# Patient Record
Sex: MALE | Race: WHITE | NOT HISPANIC OR LATINO | Employment: UNEMPLOYED | URBAN - NONMETROPOLITAN AREA
[De-identification: names, ages, dates, MRNs, and addresses within clinical notes are randomized per-mention and may not be internally consistent; named-entity substitution may affect disease eponyms.]

---

## 2017-01-18 RX ORDER — ERGOCALCIFEROL 1.25 MG/1
CAPSULE ORAL
Qty: 6 CAPSULE | Refills: 5 | Status: SHIPPED | OUTPATIENT
Start: 2017-01-18

## 2017-02-22 DIAGNOSIS — E78.49 OTHER HYPERLIPIDEMIA: ICD-10-CM

## 2017-02-22 DIAGNOSIS — E89.0 POSTOPERATIVE HYPOTHYROIDISM: ICD-10-CM

## 2017-02-22 DIAGNOSIS — Z85.850 HISTORY OF THYROID CANCER: Primary | ICD-10-CM

## 2017-02-22 DIAGNOSIS — E55.9 VITAMIN D DEFICIENCY: ICD-10-CM

## 2017-03-02 ENCOUNTER — APPOINTMENT (OUTPATIENT)
Dept: LAB | Facility: HOSPITAL | Age: 62
End: 2017-03-02

## 2017-03-02 LAB
25(OH)D3 SERPL-MCNC: 21.1 NG/ML (ref 30–100)
ALBUMIN SERPL-MCNC: 4.7 G/DL (ref 3.4–4.8)
ALBUMIN/GLOB SERPL: 1.6 G/DL (ref 1.1–1.8)
ALP SERPL-CCNC: 36 U/L (ref 38–126)
ALT SERPL W P-5'-P-CCNC: 105 U/L (ref 21–72)
ANION GAP SERPL CALCULATED.3IONS-SCNC: 15 MMOL/L (ref 5–15)
ARTICHOKE IGE QN: 99 MG/DL (ref 1–129)
AST SERPL-CCNC: 113 U/L (ref 17–59)
BASOPHILS # BLD AUTO: 0.03 10*3/MM3 (ref 0–0.2)
BASOPHILS NFR BLD AUTO: 0.6 % (ref 0–2)
BILIRUB SERPL-MCNC: 0.7 MG/DL (ref 0.2–1.3)
BUN BLD-MCNC: 9 MG/DL (ref 7–21)
BUN/CREAT SERPL: 13 (ref 7–25)
CALCIUM SPEC-SCNC: 9.5 MG/DL (ref 8.4–10.2)
CHLORIDE SERPL-SCNC: 104 MMOL/L (ref 95–110)
CHOLEST SERPL-MCNC: 189 MG/DL (ref 0–199)
CO2 SERPL-SCNC: 21 MMOL/L (ref 22–31)
CREAT BLD-MCNC: 0.69 MG/DL (ref 0.7–1.3)
DEPRECATED RDW RBC AUTO: 47 FL (ref 35.1–43.9)
EOSINOPHIL # BLD AUTO: 0.14 10*3/MM3 (ref 0–0.7)
EOSINOPHIL NFR BLD AUTO: 2.7 % (ref 0–7)
ERYTHROCYTE [DISTWIDTH] IN BLOOD BY AUTOMATED COUNT: 15 % (ref 11.5–14.5)
GFR SERPL CREATININE-BSD FRML MDRD: 117 ML/MIN/1.73 (ref 49–113)
GLOBULIN UR ELPH-MCNC: 3 GM/DL (ref 2.3–3.5)
GLUCOSE BLD-MCNC: 88 MG/DL (ref 60–100)
HCT VFR BLD AUTO: 46.9 % (ref 39–49)
HDLC SERPL-MCNC: 54 MG/DL (ref 60–200)
HGB BLD-MCNC: 16.3 G/DL (ref 13.7–17.3)
IMM GRANULOCYTES # BLD: 0.01 10*3/MM3 (ref 0–0.02)
IMM GRANULOCYTES NFR BLD: 0.2 % (ref 0–0.5)
LDLC/HDLC SERPL: 2.01 {RATIO} (ref 0–3.55)
LYMPHOCYTES # BLD AUTO: 1.86 10*3/MM3 (ref 0.6–4.2)
LYMPHOCYTES NFR BLD AUTO: 35.6 % (ref 10–50)
MCH RBC QN AUTO: 29.5 PG (ref 26.5–34)
MCHC RBC AUTO-ENTMCNC: 34.8 G/DL (ref 31.5–36.3)
MCV RBC AUTO: 84.8 FL (ref 80–98)
MONOCYTES # BLD AUTO: 0.45 10*3/MM3 (ref 0–0.9)
MONOCYTES NFR BLD AUTO: 8.6 % (ref 0–12)
NEUTROPHILS # BLD AUTO: 2.73 10*3/MM3 (ref 2–8.6)
NEUTROPHILS NFR BLD AUTO: 52.3 % (ref 37–80)
NRBC BLD MANUAL-RTO: 0 /100 WBC (ref 0–0)
PLATELET # BLD AUTO: 236 10*3/MM3 (ref 150–450)
PMV BLD AUTO: 8.9 FL (ref 8–12)
POTASSIUM BLD-SCNC: 4.5 MMOL/L (ref 3.5–5.1)
PROT SERPL-MCNC: 7.7 G/DL (ref 6.3–8.6)
RBC # BLD AUTO: 5.53 10*6/MM3 (ref 4.37–5.74)
SODIUM BLD-SCNC: 140 MMOL/L (ref 137–145)
T4 FREE SERPL-MCNC: 1.11 NG/DL (ref 0.78–2.19)
TRIGL SERPL-MCNC: 132 MG/DL (ref 20–199)
TSH SERPL DL<=0.05 MIU/L-ACNC: 2.01 MIU/ML (ref 0.46–4.68)
WBC NRBC COR # BLD: 5.22 10*3/MM3 (ref 3.2–9.8)

## 2017-03-02 PROCEDURE — 36415 COLL VENOUS BLD VENIPUNCTURE: CPT | Performed by: NURSE PRACTITIONER

## 2017-03-02 PROCEDURE — 82306 VITAMIN D 25 HYDROXY: CPT | Performed by: NURSE PRACTITIONER

## 2017-03-02 PROCEDURE — 84443 ASSAY THYROID STIM HORMONE: CPT | Performed by: NURSE PRACTITIONER

## 2017-03-02 PROCEDURE — 84439 ASSAY OF FREE THYROXINE: CPT | Performed by: NURSE PRACTITIONER

## 2017-03-02 PROCEDURE — 86800 THYROGLOBULIN ANTIBODY: CPT | Performed by: NURSE PRACTITIONER

## 2017-03-02 PROCEDURE — 84432 ASSAY OF THYROGLOBULIN: CPT | Performed by: NURSE PRACTITIONER

## 2017-03-02 PROCEDURE — 80053 COMPREHEN METABOLIC PANEL: CPT | Performed by: NURSE PRACTITIONER

## 2017-03-02 PROCEDURE — 80061 LIPID PANEL: CPT | Performed by: NURSE PRACTITIONER

## 2017-03-02 PROCEDURE — 85025 COMPLETE CBC W/AUTO DIFF WBC: CPT | Performed by: NURSE PRACTITIONER

## 2017-03-03 LAB
THYROGLOB AB SERPL-ACNC: <1 IU/ML (ref 0–0.9)
THYROGLOBULIN BY IMA: <0.1 NG/ML (ref 1.4–29.2)

## 2017-03-06 ENCOUNTER — OFFICE VISIT (OUTPATIENT)
Dept: ENDOCRINOLOGY | Facility: CLINIC | Age: 62
End: 2017-03-06

## 2017-03-06 VITALS
DIASTOLIC BLOOD PRESSURE: 78 MMHG | HEART RATE: 84 BPM | BODY MASS INDEX: 28.5 KG/M2 | HEIGHT: 69 IN | WEIGHT: 192.4 LBS | SYSTOLIC BLOOD PRESSURE: 126 MMHG

## 2017-03-06 DIAGNOSIS — R73.01 IMPAIRED FASTING GLUCOSE: ICD-10-CM

## 2017-03-06 DIAGNOSIS — E78.49 OTHER HYPERLIPIDEMIA: ICD-10-CM

## 2017-03-06 DIAGNOSIS — E89.0 POSTOPERATIVE HYPOTHYROIDISM: Primary | ICD-10-CM

## 2017-03-06 DIAGNOSIS — E55.9 VITAMIN D DEFICIENCY: ICD-10-CM

## 2017-03-06 DIAGNOSIS — C73 THYROID CANCER (HCC): ICD-10-CM

## 2017-03-06 PROCEDURE — 99214 OFFICE O/P EST MOD 30 MIN: CPT | Performed by: NURSE PRACTITIONER

## 2017-03-06 NOTE — PROGRESS NOTES
Subjective    Dallas Jaime is a 61 y.o. male. he is here today for follow-up.    History of Present Illness       60 y o male comes for followup    History of Thyroid Cancer  1999 - right follwed by left thyroidectomy followed by RUST ablation    Timing of hypothryoidism - constant, cancer in remission   Quality - controlled  Severity - moderate    Alleviating Factors - on levothyroxine 150 Mond Friday and  175 Sat Sund    ---------    Dyslipidemia on on lipitor 10 mg qhs ---stopped lipitor taking crestor   Timing constant  Duration Jan 2013  Labs reviewed, , HDL 70 ( risk factors age and smoking) s    ----------    also impaired fasting glucose   Dx Jan 2013  Timing - constant, verified May 2013  Jan 2013 2 bg fasting at 102     Evaluation history:  TSH   Date Value Ref Range Status   03/02/2017 2.010 0.460 - 4.680 mIU/mL Final   07/06/2016 1.70 0.46 - 4.68 uIU/ml Final     FREE T4   Date Value Ref Range Status   03/02/2017 1.11 0.78 - 2.19 ng/dL Final   12/07/2015 1.55 0.78 - 2.19 ng/dl Final     T3, FREE   Date Value Ref Range Status   04/18/2014 2.5 2.0 - 4.4 pg/mL Final     Comment:     Performed at:  [facility]  [id number]  : Kemal Bob MD, Phone:  [phone]         Current medications:  Current Outpatient Prescriptions   Medication Sig Dispense Refill   • CRESTOR 20 MG tablet TAKE 1 TABLET DAILY (ANYTIME OF THE DAY) 90 tablet 2   • esomeprazole (NexIUM) 20 MG capsule Take 20 mg by mouth every morning before breakfast.     • HYDROcodone-acetaminophen (NORCO) 7.5-325 MG per tablet Take 1 tablet by mouth Every 6 (Six) Hours As Needed for severe pain (7-10). 40 tablet 0   • levothyroxine (SYNTHROID, LEVOTHROID) 150 MCG tablet      • levothyroxine (SYNTHROID, LEVOTHROID) 175 MCG tablet      • vitamin D (ERGOCALCIFEROL) 87459 UNITS capsule capsule AT START OF THERAPY TAKE 1 CAPSULE EVERY WEEK FOR 12 WEEKS, THEN TAKE 1 CAPSULE EVERY 2 WEEKS  THEREAFTER 6 capsule 5     No current facility-administered medications for this visit.        The following portions of the patient's history were reviewed and updated as appropriate:   Past Medical History   Diagnosis Date   • Acute bronchitis    • Acute otitis media    • Allergic rhinitis    • Anorectal abscess    • Diverticular disease of colon    • External hemorrhoids without complication    • Foot pain      probable arthritis   • GERD (gastroesophageal reflux disease)    • Hallux valgus    • History of colon polyps    • History of malignant neoplasm of thyroid    • Hypercholesterolemia    • Hyperlipidemia    • Hypermetropia    • Impaired glucose tolerance    • Infective otitis externa      right ear   • Metatarsalgia    • Need for influenza vaccination    • Pain in eye      suspect neuralgia, TGN   • Postoperative hypothyroidism    • Presbyopia    • Sprain of knee      left   • Thyroid cancer      Follicular carcinoma.  Had both sides removed in 2 separate surgeries with radiation   • Tobacco dependence syndrome    • Upper respiratory infection    • Vitamin D deficiency    • Vitamin deficiency      Past Surgical History   Procedure Laterality Date   • Knee surgery     • Thyroidectomy Bilateral    • Appendectomy     • Colonoscopy  01/18/2013     diverticulum is sigmoid colon & descending colon.  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   • Thyroid surgery       Family History   Problem Relation Age of Onset   • Cancer Father    • Diabetes Mother    • Coronary artery disease Mother    • Diabetes type I Sister    • Cancer Other    • Coronary artery disease Other    • Diabetes Other    • Hypertension Other        No Known Allergies  Social History  "    Social History   • Marital status:      Spouse name: N/A   • Number of children: N/A   • Years of education: N/A     Social History Main Topics   • Smoking status: Current Every Day Smoker     Packs/day: 1.00   • Smokeless tobacco: None   • Alcohol use None   • Drug use: None   • Sexual activity: Not Asked     Other Topics Concern   • None     Social History Narrative       Review of Systems  Review of Systems   Constitutional: Negative for activity change, appetite change, chills, diaphoresis and fatigue.   HENT: Negative for congestion, dental problem, drooling, ear discharge, ear pain, facial swelling, sneezing, sore throat, tinnitus, trouble swallowing and voice change.    Eyes: Negative for photophobia, pain, discharge, redness, itching and visual disturbance.   Respiratory: Negative for apnea, cough, choking, chest tightness and shortness of breath.    Cardiovascular: Negative for chest pain, palpitations and leg swelling.   Gastrointestinal: Negative for abdominal distention, abdominal pain, constipation, diarrhea, nausea and vomiting.   Endocrine: Negative for cold intolerance, heat intolerance, polydipsia, polyphagia and polyuria.   Genitourinary: Negative for difficulty urinating, dysuria, frequency, hematuria and urgency.   Musculoskeletal: Negative for arthralgias, back pain, gait problem, joint swelling, myalgias, neck pain and neck stiffness.   Skin: Negative for color change, pallor, rash and wound.   Allergic/Immunologic: Negative for environmental allergies, food allergies and immunocompromised state.   Neurological: Negative for dizziness, tremors, facial asymmetry, weakness, light-headedness, numbness and headaches.   Hematological: Negative for adenopathy. Does not bruise/bleed easily.   Psychiatric/Behavioral: Negative for agitation, behavioral problems, confusion, decreased concentration and sleep disturbance.        Objective      Visit Vitals   • /78   • Pulse 84   • Ht 69\" " (175.3 cm)   • Wt 192 lb 6.4 oz (87.3 kg)   • BMI 28.41 kg/m2     Physical Exam   Constitutional: He is oriented to person, place, and time. He appears well-developed and well-nourished. No distress.   HENT:   Head: Normocephalic and atraumatic.   Right Ear: External ear normal.   Left Ear: External ear normal.   Nose: Nose normal.   Eyes: Conjunctivae and EOM are normal. Pupils are equal, round, and reactive to light.   Neck: Normal range of motion. Neck supple. No tracheal deviation present.   Thyroid surgically absent   Cardiovascular: Normal rate, regular rhythm and normal heart sounds.    No murmur heard.  Pulmonary/Chest: Effort normal and breath sounds normal. No respiratory distress. He has no wheezes.   Abdominal: Soft. Bowel sounds are normal. There is no tenderness. There is no rebound and no guarding.   Musculoskeletal: Normal range of motion. He exhibits no edema, tenderness or deformity.   Neurological: He is alert and oriented to person, place, and time. No cranial nerve deficit.   Skin: Skin is warm and dry. No rash noted.   Psychiatric: He has a normal mood and affect. His behavior is normal. Judgment and thought content normal.       Lab Review  Lab Results   Component Value Date    TSH 2.010 03/02/2017    TSH 1.70 07/06/2016     Lab Results   Component Value Date    FREET4 1.11 03/02/2017    FREET4 1.55 12/07/2015        Assessment/Plan      1. Postoperative hypothyroidism    2. Thyroid cancer    3. Vitamin D deficiency    4. Impaired fasting glucose    5. Other hyperlipidemia    . This diagnosis was discussed and reviewed with the patient including the advantages of drug therapy.     1. Orders placed during this encounter include:  Orders Placed This Encounter   Procedures   • Hemoglobin A1c   • Comprehensive Metabolic Panel     Standing Status:   Future   • TSH     Standing Status:   Future   • Hemoglobin A1c     Standing Status:   Future   • Vitamin D 25 Hydroxy     Standing Status:   Future    • Thyroglobulin With Anti-TG     Standing Status:   Future       Medications prescribed:  Outpatient Encounter Prescriptions as of 3/6/2017   Medication Sig Dispense Refill   • CRESTOR 20 MG tablet TAKE 1 TABLET DAILY (ANYTIME OF THE DAY) 90 tablet 2   • esomeprazole (NexIUM) 20 MG capsule Take 20 mg by mouth every morning before breakfast.     • HYDROcodone-acetaminophen (NORCO) 7.5-325 MG per tablet Take 1 tablet by mouth Every 6 (Six) Hours As Needed for severe pain (7-10). 40 tablet 0   • levothyroxine (SYNTHROID, LEVOTHROID) 150 MCG tablet      • levothyroxine (SYNTHROID, LEVOTHROID) 175 MCG tablet      • vitamin D (ERGOCALCIFEROL) 42159 UNITS capsule capsule AT START OF THERAPY TAKE 1 CAPSULE EVERY WEEK FOR 12 WEEKS, THEN TAKE 1 CAPSULE EVERY 2 WEEKS THEREAFTER 6 capsule 5     No facility-administered encounter medications on file as of 3/6/2017.      Plan Details  previous records from [facility] dating back to 2012 were requested, obtained and reviewed.     Thyroid cancer  initial tx , surgery and RUST ablation in 1999    euthyroid - cont same LT4 regimen described in HPI     tg - normal    tg - pending at time of visit    Dec. 2015    TSH - 0.18    keep 150mcg Mon - Friday  keep 175mcgs onSaturday and take none on Sunday June 2016    tg <1  tg ab <0.1    March 2017    TSH - 2    Tg ab <1.0  Tg <0.1    Keep current regimen  ------------    Dyslipidemia    started on lipitor in Jan 2013 but experiencing myalgias.     Since initial LDL was 170 and he has prediabetes his target LDL should be less than 100 and that requires a 40% reduction. No other statin but lipitor or crestor will be sufficient.    I wrote letter for crestor instead of lipitor    taking crestor -- continue    Oct. 2014    total chol -178  TG- 89  HDL- 71  LDL -89    March 2017    LDL - 90     Elevated liver enzymes - states drinking to much beer-- does not want referral to GI    ---------------    Prediabetes, not obese, no need for metfomin  at this point  May 2013- HgbA1c of 5.7% , IFG confirmed ( 3 bg above 99)    Dec. 2015    HgbA1c 5.6%    ---------------    Vit D Def  levels of 20 in May 2013    contiue vit D replacement    not taking    Dec. 2015    vitd - 27    please restart vit d    Not taking    March 2017    Vitamin d - 21.1        4. Return in about 6 months (around 9/6/2017) for Recheck.

## 2017-04-17 DIAGNOSIS — E89.0 POSTOPERATIVE HYPOTHYROIDISM: Primary | ICD-10-CM

## 2017-04-19 ENCOUNTER — OFFICE VISIT (OUTPATIENT)
Dept: FAMILY MEDICINE CLINIC | Facility: CLINIC | Age: 62
End: 2017-04-19

## 2017-04-19 VITALS
HEIGHT: 69 IN | OXYGEN SATURATION: 98 % | HEART RATE: 91 BPM | DIASTOLIC BLOOD PRESSURE: 90 MMHG | SYSTOLIC BLOOD PRESSURE: 140 MMHG | WEIGHT: 188 LBS | BODY MASS INDEX: 27.85 KG/M2

## 2017-04-19 DIAGNOSIS — F17.210 CIGARETTE SMOKER: ICD-10-CM

## 2017-04-19 DIAGNOSIS — F41.0 PANIC DISORDER: Primary | ICD-10-CM

## 2017-04-19 DIAGNOSIS — Z71.6 TOBACCO ABUSE COUNSELING: ICD-10-CM

## 2017-04-19 PROCEDURE — 99202 OFFICE O/P NEW SF 15 MIN: CPT | Performed by: FAMILY MEDICINE

## 2017-04-19 NOTE — PATIENT INSTRUCTIONS
Panic Attacks  Panic attacks are sudden, short-lived surges of severe anxiety, fear, or discomfort. They may occur for no reason when you are relaxed, when you are anxious, or when you are sleeping. Panic attacks may occur for a number of reasons:   · Healthy people occasionally have panic attacks in extreme, life-threatening situations, such as war or natural disasters. Normal anxiety is a protective mechanism of the body that helps us react to danger (fight or flight response).  · Panic attacks are often seen with anxiety disorders, such as panic disorder, social anxiety disorder, generalized anxiety disorder, and phobias. Anxiety disorders cause excessive or uncontrollable anxiety. They may interfere with your relationships or other life activities.  · Panic attacks are sometimes seen with other mental illnesses, such as depression and posttraumatic stress disorder.  · Certain medical conditions, prescription medicines, and drugs of abuse can cause panic attacks.  SYMPTOMS   Panic attacks start suddenly, peak within 20 minutes, and are accompanied by four or more of the following symptoms:  · Pounding heart or fast heart rate (palpitations).  · Sweating.  · Trembling or shaking.  · Shortness of breath or feeling smothered.  · Feeling choked.  · Chest pain or discomfort.  · Nausea or strange feeling in your stomach.  · Dizziness, light-headedness, or feeling like you will faint.  · Chills or hot flushes.  · Numbness or tingling in your lips or hands and feet.  · Feeling that things are not real or feeling that you are not yourself.  · Fear of losing control or going crazy.  · Fear of dying.  Some of these symptoms can mimic serious medical conditions. For example, you may think you are having a heart attack. Although panic attacks can be very scary, they are not life threatening.  DIAGNOSIS   Panic attacks are diagnosed through an assessment by your health care provider. Your health care provider will ask  questions about your symptoms, such as where and when they occurred. Your health care provider will also ask about your medical history and use of alcohol and drugs, including prescription medicines. Your health care provider may order blood tests or other studies to rule out a serious medical condition. Your health care provider may refer you to a mental health professional for further evaluation.  TREATMENT   · Most healthy people who have one or two panic attacks in an extreme, life-threatening situation will not require treatment.  · The treatment for panic attacks associated with anxiety disorders or other mental illness typically involves counseling with a mental health professional, medicine, or a combination of both. Your health care provider will help determine what treatment is best for you.  · Panic attacks due to physical illness usually go away with treatment of the illness. If prescription medicine is causing panic attacks, talk with your health care provider about stopping the medicine, decreasing the dose, or substituting another medicine.  · Panic attacks due to alcohol or drug abuse go away with abstinence. Some adults need professional help in order to stop drinking or using drugs.  HOME CARE INSTRUCTIONS   · Take all medicines as directed by your health care provider.    · Schedule and attend follow-up visits as directed by your health care provider. It is important to keep all your appointments.  SEEK MEDICAL CARE IF:  · You are not able to take your medicines as prescribed.  · Your symptoms do not improve or get worse.  SEEK IMMEDIATE MEDICAL CARE IF:   · You experience panic attack symptoms that are different than your usual symptoms.  · You have serious thoughts about hurting yourself or others.  · You are taking medicine for panic attacks and have a serious side effect.  MAKE SURE YOU:  · Understand these instructions.  · Will watch your condition.  · Will get help right away if you are not  doing well or get worse.     This information is not intended to replace advice given to you by your health care provider. Make sure you discuss any questions you have with your health care provider.     Document Released: 12/18/2006 Document Revised: 12/23/2014 Document Reviewed: 08/01/2014  Elsevier Interactive Patient Education ©2016 [employer]

## 2017-04-19 NOTE — PROGRESS NOTES
Subjective   Dallas Jaime is a 61 y.o. male. He has long history of panic attacks which usually strike in the morning. His symptoms were treated for many years with Xanax. He is a retired nurse. Family history includes bipolar illness in father. He has a history of thyroid problems managed by a local endocrinologist.    Panic Attack   This is a chronic problem. The current episode started more than 1 year ago. The problem occurs intermittently. The problem has been gradually worsening. Pertinent negatives include no abdominal pain, arthralgias, chest pain, coughing, fatigue, fever, headaches, nausea, rash, sore throat or weakness. Nothing aggravates the symptoms. He has tried nothing for the symptoms. The treatment provided no relief.        The following portions of the patient's history were reviewed and updated as appropriate: allergies, current medications, past family history, past medical history, past social history, past surgical history and problem list.    Review of Systems   Constitutional: Negative for activity change, appetite change, fatigue and fever.   HENT: Negative for ear pain and sore throat.    Eyes: Negative for pain and visual disturbance.   Respiratory: Negative for cough and shortness of breath.    Cardiovascular: Negative for chest pain and palpitations.   Gastrointestinal: Negative for abdominal pain and nausea.   Endocrine: Negative for cold intolerance and heat intolerance.   Genitourinary: Negative for difficulty urinating and dysuria.   Musculoskeletal: Negative for arthralgias and gait problem.   Skin: Negative for color change and rash.   Neurological: Negative for dizziness, weakness and headaches.   Hematological: Negative for adenopathy. Does not bruise/bleed easily.   Psychiatric/Behavioral: Negative for agitation, confusion and sleep disturbance. The patient is nervous/anxious.        Objective   Physical Exam   Constitutional: He is oriented to person, place, and time.  He appears well-developed and well-nourished.   HENT:   Head: Normocephalic and atraumatic.   Right Ear: External ear normal.   Left Ear: External ear normal.   Nose: Nose normal.   Mouth/Throat: Oropharynx is clear and moist.   Eyes: Conjunctivae and EOM are normal. Pupils are equal, round, and reactive to light. Right eye exhibits no discharge. Left eye exhibits no discharge. No scleral icterus.   Neck: Normal range of motion. Neck supple. No JVD present. No tracheal deviation present. No thyromegaly present.   Cardiovascular: Normal rate, regular rhythm, normal heart sounds and intact distal pulses.  Exam reveals no gallop and no friction rub.    No murmur heard.  Pulmonary/Chest: Effort normal and breath sounds normal. No stridor. No respiratory distress. He has no wheezes. He has no rales. He exhibits no tenderness.   Abdominal: Soft. Bowel sounds are normal. He exhibits no distension and no mass. There is no tenderness. There is no rebound and no guarding. No hernia.   Musculoskeletal: Normal range of motion. He exhibits no edema or deformity.   Lymphadenopathy:     He has no cervical adenopathy.   Neurological: He is alert and oriented to person, place, and time. He exhibits normal muscle tone. Coordination normal.   Skin: Skin is warm and dry. No erythema.   Psychiatric: He has a normal mood and affect. His speech is normal and behavior is normal. Judgment and thought content normal. His mood appears not anxious. He is not agitated. Cognition and memory are normal. He does not exhibit a depressed mood.   Nursing note and vitals reviewed.      Assessment/Plan   Dallas was seen today for [facility] care and panic attack.    Diagnoses and all orders for this visit:    Panic disorder  -     Discontinue: sertraline (ZOLOFT) 50 MG tablet; Take 1 tablet by mouth Daily.  -     sertraline (ZOLOFT) 50 MG tablet; Take 1 tablet by mouth Daily.    Cigarette smoker    Tobacco abuse counseling    Monitor panic symptoms and  f/u in1 months.

## 2017-04-21 DIAGNOSIS — F41.0 ANXIETY ATTACK: Primary | ICD-10-CM

## 2017-04-21 DIAGNOSIS — F41.9 ANXIETY: Primary | ICD-10-CM

## 2017-04-21 RX ORDER — BUSPIRONE HYDROCHLORIDE 30 MG/1
15 TABLET ORAL 2 TIMES DAILY
Qty: 15 TABLET | Refills: 2 | Status: SHIPPED | OUTPATIENT
Start: 2017-04-21 | End: 2017-05-04

## 2017-04-21 RX ORDER — FLUOXETINE HYDROCHLORIDE 20 MG/1
20 CAPSULE ORAL DAILY
Qty: 30 CAPSULE | Refills: 2 | Status: SHIPPED | OUTPATIENT
Start: 2017-04-21 | End: 2017-05-04

## 2017-05-04 ENCOUNTER — OFFICE VISIT (OUTPATIENT)
Dept: FAMILY MEDICINE CLINIC | Facility: CLINIC | Age: 62
End: 2017-05-04

## 2017-05-04 VITALS
OXYGEN SATURATION: 98 % | DIASTOLIC BLOOD PRESSURE: 80 MMHG | BODY MASS INDEX: 27.99 KG/M2 | SYSTOLIC BLOOD PRESSURE: 120 MMHG | WEIGHT: 189 LBS | HEART RATE: 84 BPM | HEIGHT: 69 IN

## 2017-05-04 DIAGNOSIS — F41.0 PANIC DISORDER: Primary | ICD-10-CM

## 2017-05-04 DIAGNOSIS — Z23 NEED FOR VACCINATION: ICD-10-CM

## 2017-05-04 PROCEDURE — 90715 TDAP VACCINE 7 YRS/> IM: CPT | Performed by: FAMILY MEDICINE

## 2017-05-04 PROCEDURE — 90471 IMMUNIZATION ADMIN: CPT | Performed by: FAMILY MEDICINE

## 2017-05-04 PROCEDURE — 99214 OFFICE O/P EST MOD 30 MIN: CPT | Performed by: FAMILY MEDICINE

## 2017-05-04 RX ORDER — CLONAZEPAM 0.5 MG/1
TABLET ORAL
Qty: 30 TABLET | Refills: 0 | Status: SHIPPED | OUTPATIENT
Start: 2017-05-04 | End: 2017-05-18 | Stop reason: SDUPTHER

## 2017-05-18 ENCOUNTER — OFFICE VISIT (OUTPATIENT)
Dept: FAMILY MEDICINE CLINIC | Facility: CLINIC | Age: 62
End: 2017-05-18

## 2017-05-18 VITALS
SYSTOLIC BLOOD PRESSURE: 122 MMHG | HEIGHT: 69 IN | WEIGHT: 191.2 LBS | BODY MASS INDEX: 28.32 KG/M2 | DIASTOLIC BLOOD PRESSURE: 80 MMHG

## 2017-05-18 DIAGNOSIS — F41.0 PANIC DISORDER: Primary | ICD-10-CM

## 2017-05-18 DIAGNOSIS — Z11.59 NEED FOR HEPATITIS C SCREENING TEST: ICD-10-CM

## 2017-05-18 PROCEDURE — 99213 OFFICE O/P EST LOW 20 MIN: CPT | Performed by: FAMILY MEDICINE

## 2017-05-18 RX ORDER — CLONAZEPAM 0.5 MG/1
TABLET ORAL
Qty: 19 TABLET | Refills: 1 | Status: SHIPPED | OUTPATIENT
Start: 2017-05-18 | End: 2017-05-18 | Stop reason: SDUPTHER

## 2017-05-18 RX ORDER — CLONAZEPAM 0.5 MG/1
TABLET ORAL
Qty: 90 TABLET | Refills: 1 | Status: SHIPPED | OUTPATIENT
Start: 2017-05-18 | End: 2017-09-06 | Stop reason: SDUPTHER

## 2017-07-07 RX ORDER — LEVOTHYROXINE SODIUM 0.15 MG/1
TABLET ORAL
Qty: 65 TABLET | Refills: 2 | Status: SHIPPED | OUTPATIENT
Start: 2017-07-07 | End: 2017-09-06

## 2017-07-19 ENCOUNTER — OFFICE VISIT (OUTPATIENT)
Dept: FAMILY MEDICINE CLINIC | Facility: CLINIC | Age: 62
End: 2017-07-19

## 2017-07-19 VITALS
HEIGHT: 69 IN | DIASTOLIC BLOOD PRESSURE: 90 MMHG | SYSTOLIC BLOOD PRESSURE: 158 MMHG | WEIGHT: 189.4 LBS | BODY MASS INDEX: 28.05 KG/M2

## 2017-07-19 DIAGNOSIS — R53.83 FATIGUE, UNSPECIFIED TYPE: Primary | ICD-10-CM

## 2017-07-19 DIAGNOSIS — R00.2 HEART PALPITATIONS: ICD-10-CM

## 2017-07-19 PROBLEM — E89.0 POSTOPERATIVE HYPOTHYROIDISM: Chronic | Status: ACTIVE | Noted: 2017-03-06

## 2017-07-19 PROBLEM — F41.0 PANIC DISORDER: Chronic | Status: ACTIVE | Noted: 2017-04-19

## 2017-07-19 PROBLEM — R73.01 IMPAIRED FASTING GLUCOSE: Chronic | Status: ACTIVE | Noted: 2017-03-06

## 2017-07-19 PROBLEM — Z71.6 TOBACCO ABUSE COUNSELING: Status: RESOLVED | Noted: 2017-04-19 | Resolved: 2017-07-19

## 2017-07-19 PROBLEM — E55.9 VITAMIN D DEFICIENCY: Chronic | Status: ACTIVE | Noted: 2017-03-06

## 2017-07-19 PROBLEM — F17.210 CIGARETTE SMOKER: Chronic | Status: ACTIVE | Noted: 2017-04-19

## 2017-07-19 PROBLEM — C73 THYROID CANCER (HCC): Chronic | Status: ACTIVE | Noted: 2017-03-06

## 2017-07-19 PROCEDURE — 99214 OFFICE O/P EST MOD 30 MIN: CPT | Performed by: FAMILY MEDICINE

## 2017-07-19 NOTE — PROGRESS NOTES
Subjective   Dallas Jaime is a 61 y.o. male.     History of Present Illness requesting evaluation 2 weeks of early morning late morning fatigability history of intermittent palpitations of heart.  Mild tiredness at the end of the day.  No real PND or orthopnea.  No real chest pain.  Risk factors of tobacco abuse thyroid disease treated as mentioned previously.  Is due for thyroid evaluation soon.  No unusual weight loss or weight gain.  History noted.    The following portions of the patient's history were reviewed and updated as appropriate: allergies, current medications, past family history, past medical history, past social history, past surgical history and problem list.    Review of Systems   Constitutional: Positive for fatigue. Negative for activity change, appetite change and unexpected weight change.   HENT: Negative for trouble swallowing and voice change.    Eyes: Negative for redness and visual disturbance.   Respiratory: Negative for cough and wheezing.    Cardiovascular: Positive for palpitations. Negative for chest pain.   Gastrointestinal: Negative for abdominal pain, constipation, diarrhea, nausea and vomiting.   Genitourinary: Negative for urgency.   Musculoskeletal: Negative for joint swelling.   Neurological: Negative for syncope and headaches.   Hematological: Negative for adenopathy.   Psychiatric/Behavioral: Negative for sleep disturbance.       Objective   Physical Exam   Constitutional: He appears well-developed.   HENT:   Head: Normocephalic.   Eyes: Pupils are equal, round, and reactive to light.   Neck: Normal range of motion. No JVD present. No tracheal deviation present. No thyromegaly present.   Cardiovascular: Normal rate, regular rhythm, normal heart sounds and intact distal pulses.    Pulmonary/Chest: Effort normal and breath sounds normal. No respiratory distress.   Abdominal: Soft. Bowel sounds are normal.   Neurological: He is alert. He has normal reflexes.    Psychiatric: He has a normal mood and affect. His speech is normal. He is slowed.       Assessment/Plan   Problems Addressed this Visit     None      Visit Diagnoses     Fatigue, unspecified type    -  Primary    Relevant Orders    Ambulatory Referral to Cardiology    Heart palpitations        Relevant Orders    Ambulatory Referral to Cardiology        Constitutional symptoms.  At risk for intermittent atrial fib and/or other structural cardiac disease.   need for cardiac evaluation as well as getting thyroid studies redone per endocrinology.  Have made arrangements for same.  Counseled on previous.  Follow-up based on results.

## 2017-07-20 ENCOUNTER — APPOINTMENT (OUTPATIENT)
Dept: GENERAL RADIOLOGY | Facility: HOSPITAL | Age: 62
End: 2017-07-20

## 2017-07-20 ENCOUNTER — APPOINTMENT (OUTPATIENT)
Dept: CT IMAGING | Facility: HOSPITAL | Age: 62
End: 2017-07-20

## 2017-07-20 ENCOUNTER — HOSPITAL ENCOUNTER (EMERGENCY)
Facility: HOSPITAL | Age: 62
Discharge: HOME | End: 2017-07-20
Attending: EMERGENCY MEDICINE | Admitting: EMERGENCY MEDICINE

## 2017-07-20 VITALS
TEMPERATURE: 98.5 F | HEIGHT: 69 IN | DIASTOLIC BLOOD PRESSURE: 91 MMHG | SYSTOLIC BLOOD PRESSURE: 160 MMHG | WEIGHT: 180 LBS | HEART RATE: 63 BPM | BODY MASS INDEX: 26.66 KG/M2 | OXYGEN SATURATION: 97 % | RESPIRATION RATE: 20 BRPM

## 2017-07-20 DIAGNOSIS — R00.2 INTERMITTENT PALPITATIONS: Primary | ICD-10-CM

## 2017-07-20 LAB
ALBUMIN SERPL-MCNC: 4.6 G/DL (ref 3.4–4.8)
ALBUMIN/GLOB SERPL: 1.4 G/DL (ref 1.1–1.8)
ALP SERPL-CCNC: 45 U/L (ref 38–126)
ALT SERPL W P-5'-P-CCNC: 131 U/L (ref 21–72)
ANION GAP SERPL CALCULATED.3IONS-SCNC: 18 MMOL/L (ref 5–15)
APTT PPP: 25.8 SECONDS (ref 20–40.3)
AST SERPL-CCNC: 195 U/L (ref 17–59)
BASOPHILS # BLD AUTO: 0.02 10*3/MM3 (ref 0–0.2)
BASOPHILS NFR BLD AUTO: 0.4 % (ref 0–2)
BILIRUB SERPL-MCNC: 0.6 MG/DL (ref 0.2–1.3)
BUN BLD-MCNC: 7 MG/DL (ref 7–21)
BUN/CREAT SERPL: 8.6 (ref 7–25)
CALCIUM SPEC-SCNC: 9.9 MG/DL (ref 8.4–10.2)
CHLORIDE SERPL-SCNC: 102 MMOL/L (ref 95–110)
CK MB SERPL-CCNC: 0.7 NG/ML (ref 0–5)
CK SERPL-CCNC: 99 U/L (ref 55–170)
CO2 SERPL-SCNC: 24 MMOL/L (ref 22–31)
CREAT BLD-MCNC: 0.81 MG/DL (ref 0.7–1.3)
D-DIMER, QUANTITATIVE (MAD,POW, STR): 557 NG/ML (FEU) (ref 0–470)
DEPRECATED RDW RBC AUTO: 48.5 FL (ref 35.1–43.9)
EOSINOPHIL # BLD AUTO: 0.07 10*3/MM3 (ref 0–0.7)
EOSINOPHIL NFR BLD AUTO: 1.3 % (ref 0–7)
ERYTHROCYTE [DISTWIDTH] IN BLOOD BY AUTOMATED COUNT: 15.1 % (ref 11.5–14.5)
GFR SERPL CREATININE-BSD FRML MDRD: 97 ML/MIN/1.73 (ref 60–113)
GLOBULIN UR ELPH-MCNC: 3.2 GM/DL (ref 2.3–3.5)
GLUCOSE BLD-MCNC: 98 MG/DL (ref 60–100)
HCT VFR BLD AUTO: 45.2 % (ref 39–49)
HGB BLD-MCNC: 15.4 G/DL (ref 13.7–17.3)
HOLD SPECIMEN: NORMAL
HOLD SPECIMEN: NORMAL
IMM GRANULOCYTES # BLD: 0.01 10*3/MM3 (ref 0–0.02)
IMM GRANULOCYTES NFR BLD: 0.2 % (ref 0–0.5)
INR PPP: 0.89 (ref 0.8–1.2)
LYMPHOCYTES # BLD AUTO: 2 10*3/MM3 (ref 0.6–4.2)
LYMPHOCYTES NFR BLD AUTO: 38.5 % (ref 10–50)
MAGNESIUM SERPL-MCNC: 1.8 MG/DL (ref 1.6–2.3)
MCH RBC QN AUTO: 30 PG (ref 26.5–34)
MCHC RBC AUTO-ENTMCNC: 34.1 G/DL (ref 31.5–36.3)
MCV RBC AUTO: 88.1 FL (ref 80–98)
MONOCYTES # BLD AUTO: 0.44 10*3/MM3 (ref 0–0.9)
MONOCYTES NFR BLD AUTO: 8.5 % (ref 0–12)
NEUTROPHILS # BLD AUTO: 2.65 10*3/MM3 (ref 2–8.6)
NEUTROPHILS NFR BLD AUTO: 51.1 % (ref 37–80)
NT-PROBNP SERPL-MCNC: 16.4 PG/ML (ref 0–900)
PLATELET # BLD AUTO: 163 10*3/MM3 (ref 150–450)
PMV BLD AUTO: 11.1 FL (ref 8–12)
POTASSIUM BLD-SCNC: 3.6 MMOL/L (ref 3.5–5.1)
PROT SERPL-MCNC: 7.8 G/DL (ref 6.3–8.6)
PROTHROMBIN TIME: 11.9 SECONDS (ref 11.1–15.3)
RBC # BLD AUTO: 5.13 10*6/MM3 (ref 4.37–5.74)
SODIUM BLD-SCNC: 144 MMOL/L (ref 137–145)
TROPONIN I SERPL-MCNC: <0.012 NG/ML
TROPONIN I SERPL-MCNC: <0.012 NG/ML
TSH SERPL DL<=0.05 MIU/L-ACNC: 4.87 MIU/ML (ref 0.46–4.68)
WBC NRBC COR # BLD: 5.19 10*3/MM3 (ref 3.2–9.8)
WHOLE BLOOD HOLD SPECIMEN: NORMAL
WHOLE BLOOD HOLD SPECIMEN: NORMAL

## 2017-07-20 PROCEDURE — 93005 ELECTROCARDIOGRAM TRACING: CPT

## 2017-07-20 PROCEDURE — 96360 HYDRATION IV INFUSION INIT: CPT

## 2017-07-20 PROCEDURE — 85379 FIBRIN DEGRADATION QUANT: CPT | Performed by: EMERGENCY MEDICINE

## 2017-07-20 PROCEDURE — 83735 ASSAY OF MAGNESIUM: CPT | Performed by: EMERGENCY MEDICINE

## 2017-07-20 PROCEDURE — 85730 THROMBOPLASTIN TIME PARTIAL: CPT | Performed by: EMERGENCY MEDICINE

## 2017-07-20 PROCEDURE — 82550 ASSAY OF CK (CPK): CPT | Performed by: EMERGENCY MEDICINE

## 2017-07-20 PROCEDURE — 99283 EMERGENCY DEPT VISIT LOW MDM: CPT

## 2017-07-20 PROCEDURE — 84443 ASSAY THYROID STIM HORMONE: CPT | Performed by: EMERGENCY MEDICINE

## 2017-07-20 PROCEDURE — 85610 PROTHROMBIN TIME: CPT | Performed by: EMERGENCY MEDICINE

## 2017-07-20 PROCEDURE — 85025 COMPLETE CBC W/AUTO DIFF WBC: CPT | Performed by: EMERGENCY MEDICINE

## 2017-07-20 PROCEDURE — 84484 ASSAY OF TROPONIN QUANT: CPT | Performed by: EMERGENCY MEDICINE

## 2017-07-20 PROCEDURE — 99284 EMERGENCY DEPT VISIT MOD MDM: CPT

## 2017-07-20 PROCEDURE — 80053 COMPREHEN METABOLIC PANEL: CPT | Performed by: EMERGENCY MEDICINE

## 2017-07-20 PROCEDURE — 82553 CREATINE MB FRACTION: CPT | Performed by: EMERGENCY MEDICINE

## 2017-07-20 PROCEDURE — 93010 ELECTROCARDIOGRAM REPORT: CPT | Performed by: INTERNAL MEDICINE

## 2017-07-20 PROCEDURE — 71275 CT ANGIOGRAPHY CHEST: CPT

## 2017-07-20 PROCEDURE — 71020 HC CHEST PA AND LATERAL: CPT

## 2017-07-20 PROCEDURE — 83880 ASSAY OF NATRIURETIC PEPTIDE: CPT | Performed by: EMERGENCY MEDICINE

## 2017-07-20 PROCEDURE — 0 IOPAMIDOL PER 1 ML: Performed by: EMERGENCY MEDICINE

## 2017-07-20 RX ORDER — ASPIRIN 325 MG
325 TABLET ORAL ONCE
Status: COMPLETED | OUTPATIENT
Start: 2017-07-20 | End: 2017-07-20

## 2017-07-20 RX ORDER — IPRATROPIUM BROMIDE AND ALBUTEROL SULFATE 2.5; .5 MG/3ML; MG/3ML
3 SOLUTION RESPIRATORY (INHALATION) ONCE
Status: COMPLETED | OUTPATIENT
Start: 2017-07-20 | End: 2017-07-20

## 2017-07-20 RX ADMIN — SODIUM CHLORIDE 500 ML: 9 INJECTION, SOLUTION INTRAVENOUS at 20:50

## 2017-07-20 RX ADMIN — IPRATROPIUM BROMIDE AND ALBUTEROL SULFATE 3 ML: 2.5; .5 SOLUTION RESPIRATORY (INHALATION) at 19:12

## 2017-07-20 RX ADMIN — METOPROLOL TARTRATE 12.5 MG: 25 TABLET, FILM COATED ORAL at 22:13

## 2017-07-20 RX ADMIN — IOPAMIDOL 80 ML: 755 INJECTION, SOLUTION INTRAVENOUS at 20:07

## 2017-07-20 RX ADMIN — ASPIRIN 325 MG: 325 TABLET, COATED ORAL at 19:12

## 2017-07-21 ENCOUNTER — OFFICE VISIT (OUTPATIENT)
Dept: ENDOCRINOLOGY | Facility: CLINIC | Age: 62
End: 2017-07-21

## 2017-07-21 VITALS
WEIGHT: 188.9 LBS | DIASTOLIC BLOOD PRESSURE: 75 MMHG | BODY MASS INDEX: 27.98 KG/M2 | HEIGHT: 69 IN | SYSTOLIC BLOOD PRESSURE: 130 MMHG | HEART RATE: 76 BPM

## 2017-07-21 DIAGNOSIS — F17.210 CIGARETTE SMOKER: Chronic | ICD-10-CM

## 2017-07-21 DIAGNOSIS — E89.0 POSTOPERATIVE HYPOTHYROIDISM: Primary | Chronic | ICD-10-CM

## 2017-07-21 DIAGNOSIS — C73 THYROID CANCER (HCC): Chronic | ICD-10-CM

## 2017-07-21 DIAGNOSIS — R73.01 IMPAIRED FASTING GLUCOSE: ICD-10-CM

## 2017-07-21 PROCEDURE — 99214 OFFICE O/P EST MOD 30 MIN: CPT | Performed by: NURSE PRACTITIONER

## 2017-07-21 NOTE — ED PROVIDER NOTES
Subjective   HPI Comments: 61 years old male with history of hypertension/smoking/hyperlipidemia presented in the ER with 2 weeks history of on and off palpitations which last for a few minutes to almost an hour and improved without any intervention.  He has been referred to cardiology but has not seen yet.  Denies any chest pain or shortness of breath.  No discomfort or pressure sensation.  No leg swelling.  Time it started almost an hour prior to arrival and is back to his baseline by the time I saw this patient.  He is not having any symptoms at present.    Patient is a 61 y.o. male presenting with palpitations.   History provided by:  Patient  Palpitations   Palpitations quality:  Irregular  Onset quality:  Sudden  Duration: on and off for 2 weeks   Timing:  Intermittent  Progression:  Waxing and waning  Chronicity:  New  Context: anxiety    Relieved by:  Nothing  Worsened by:  Nothing  Ineffective treatments:  None tried  Associated symptoms: no back pain, no chest pain, no chest pressure, no cough, no diaphoresis, no dizziness, no hemoptysis, no leg pain, no lower extremity edema, no malaise/fatigue, no nausea, no near-syncope, no numbness, no orthopnea, no shortness of breath, no vomiting and no weakness    Risk factors: hx of thyroid disease    Risk factors: no hx of atrial fibrillation, no hx of PE and no hypercoagulable state        Review of Systems   Constitutional: Negative for chills, diaphoresis, fever and malaise/fatigue.   HENT: Negative for congestion, facial swelling, rhinorrhea and sinus pressure.    Eyes: Negative for photophobia and visual disturbance.   Respiratory: Negative for cough, hemoptysis, chest tightness, shortness of breath and wheezing.    Cardiovascular: Positive for palpitations. Negative for chest pain, orthopnea and near-syncope.   Gastrointestinal: Negative for abdominal pain, diarrhea, nausea and vomiting.   Endocrine: Negative for polyuria.   Genitourinary: Negative for flank  pain.   Musculoskeletal: Negative for back pain, joint swelling and neck pain.   Skin: Negative for rash.   Neurological: Negative for dizziness, seizures, weakness, numbness and headaches.   Hematological: Negative for adenopathy.   Psychiatric/Behavioral: Negative for agitation.       Past Medical History:   Diagnosis Date   • Acute bronchitis    • Acute otitis media    • Allergic rhinitis    • Anorectal abscess    • Diverticular disease of colon    • External hemorrhoids without complication    • Foot pain     probable arthritis   • GERD (gastroesophageal reflux disease)    • Hallux valgus    • History of colon polyps    • History of malignant neoplasm of thyroid    • Hypercholesterolemia    • Hyperlipidemia    • Hypermetropia    • Impaired glucose tolerance    • Infective otitis externa     right ear   • Lip cancer    • Metatarsalgia    • Need for influenza vaccination    • Pain in eye     suspect neuralgia, TGN   • Postoperative hypothyroidism    • Presbyopia    • Sprain of knee     left   • Thyroid cancer     Follicular carcinoma.  Had both sides removed in 2 separate surgeries with radiation   • Tobacco dependence syndrome    • Upper respiratory infection    • Vitamin D deficiency    • Vitamin deficiency        No Known Allergies    Past Surgical History:   Procedure Laterality Date   • APPENDECTOMY     • COLONOSCOPY  01/18/2013    diverticulum is sigmoid colon & descending colon.  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   • THYROID SURGERY     • THYROIDECTOMY Bilateral        Family History   Problem Relation Age of Onset   • Cancer Father    • Diabetes Mother    • Coronary artery disease Mother    •  Diabetes type I Sister    • Cancer Other    • Coronary artery disease Other    • Diabetes Other    • Hypertension Other        Social History     Social History   • Marital status:      Spouse name: N/A   • Number of children: N/A   • Years of education: N/A     Social History Main Topics   • Smoking status: Current Every Day Smoker     Packs/day: 1.00   • Smokeless tobacco: Never Used   • Alcohol use 2.4 - 3.0 oz/week     4 - 5 Cans of beer per week      Comment: per day   • Drug use: No   • Sexual activity: Not Asked     Other Topics Concern   • None     Social History Narrative   • None           Objective   Physical Exam   Constitutional: He is oriented to person, place, and time. He appears well-developed and well-nourished. No distress.   HENT:   Head: Normocephalic and atraumatic.   Eyes: Conjunctivae and EOM are normal. Pupils are equal, round, and reactive to light.   Neck: Normal range of motion. Neck supple.   Cardiovascular: Normal rate, regular rhythm and normal heart sounds.    Pulmonary/Chest: Effort normal and breath sounds normal. No respiratory distress. He has no wheezes.   Abdominal: Soft. Bowel sounds are normal. There is no tenderness. There is no guarding.   Musculoskeletal: Normal range of motion. He exhibits no edema or deformity.   Neurological: He is alert and oriented to person, place, and time.   Skin: Skin is warm and dry. No rash noted.   Psychiatric: He has a normal mood and affect.   Nursing note and vitals reviewed.      ECG 12 Lead    Date/Time: 7/20/2017 4:50 PM  Performed by: ORA SAVAGE  Authorized by: ORA SAVAGE   Interpreted by physician  Rhythm: sinus rhythm  Rate: normal  BPM: 87  QRS axis: left  Conduction comments: RSR pattern  Clinical impression: abnormal ECG  Comments: Non specific T wave changes.               ED Course  ED Course   Comment By Time   Broad workup for palpitations is done.  EKG shows normal sinus rhythm with left axis deviation and RSR  pattern.  No previous EKGs available.  Normal white count.  Negative troponins ×1.  Has mildly elevated d-dimer but have ruled out pulmonary embolism, pneumonia, pneumothorax, effusion, dissection.  He is given breathing treatment and aspirin.  On reevaluation patient is feeling better.  He does not have any palpitations while in the ER.  I have offered him observation admission but patient wants to go home with outpatient cardiology follow-up.  I have discussed with Dr. Bradshaw who recommended small dose of metoprolol and outpatient follow-up in the morning.  I have discussed plan with the patient to agrees with that.  We'll obtain second set of cardiac enzymes before sending him home. Ketan Treviño MD 07/20 2136   R/o ACS, probably needs Holter monitoring to make sure he does not have any underlying arrhythmia.  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 Ketan Treviño MD 07/20 3686          Labs Reviewed   COMPREHENSIVE METABOLIC PANEL - Abnormal; Notable for the following:        Result Value    ALT (SGPT) 131 (*)     AST (SGOT) 195 (*)     Anion Gap 18.0 (*)     All other components within normal limits   D-DIMER, QUANTITATIVE - Abnormal; Notable for the following:     D-Dimer, Quantitative 557 (*)     All other components within normal limits    Narrative:     Dimer values <500 ng/ml FEU are FDA approved as aid in diagnosis of deep venous thrombosis and pulmonary embolism.  This test should not be used in an exclusion strategy with pretest probability alone.    A recent guideline regarding diagnosis for pulmonary thomboembolism recommends an adjusted exclusion criterion of age x 10 ng/ml FEU for patients >50 years of age ([employer] Intern Med 2015; 163: 701-711).   TSH - Abnormal; Notable for the following:     TSH 4.870 (*)     All other components within normal limits   CBC WITH AUTO DIFFERENTIAL - Abnormal; Notable for the following:     RDW 15.1 (*)     RDW-SD 48.5 (*)     All  other components within normal limits   PROTIME-INR - Normal    Narrative:     Therapeutic range for most indications is 2.0-3.0 INR,  or 2.5-3.5 for mechanical heart valves.   APTT - Normal    Narrative:     The recommended Heparin therapeutic range is 68-97 seconds.   BNP (IN-HOUSE) - Normal   CK - Normal   CK MB - Normal   TROPONIN (IN-HOUSE) - Normal   TROPONIN (IN-HOUSE) - Normal   MAGNESIUM - Normal   RAINBOW DRAW    Narrative:     The following orders were created for panel order [facility] Draw.  Procedure                               Abnormality         Status                     ---------                               -----------         ------                     Light Blue Top[[id number]]                                   Final result               Green Top (Gel)[[id number]]                                  Final result               Lavender Top[[id number]]                                     Final result               Gold Top - SST[[id number]]                                   Final result                 Please view results for these tests on the individual orders.   LIGHT BLUE TOP   GREEN TOP   LAVENDER TOP   GOLD TOP - SST   CBC AND DIFFERENTIAL    Narrative:     The following orders were created for panel order CBC & Differential.  Procedure                               Abnormality         Status                     ---------                               -----------         ------                     CBC Auto Differential[[id number]]        Abnormal            Final result                 Please view results for these tests on the individual orders.       Xr Chest 2 View    Result Date: 7/20/2017  Narrative: PROCEDURE: XR CHEST 2 VIEWS INDICATION FOR PROCEDURE:  61 years -old patient presents for evaluation of palpitations. COMPARISON:  April 18, 2014 FINDINGS: XR CHEST 2 views  reveal the lungs are expanded. Thoracic aorta is tortuous with vascular calcifications. There is no radiographic evidence  for airspace consolidation, pleural effusion or pneumothorax. Mediastinal and cardiac silhouettes are within normal limits. There is multilevel thoracic spondylosis. There is mild pectus carinatum.     Impression: No radiographic evidence of acute cardiopulmonary disease. Electronically signed by:  Chikis Barker MD  7/20/2017 8:29 PM CDT Workstation: [id number]    Ct Angiogram Chest With Contrast    Result Date: 7/20/2017  Narrative: PROCEDURE: CT ANGIOGRAM CHEST W CONTRAST .      EXAM:  Computed Tomography with CTA       REGION:  Chest     INDICATION:   Palpitations  - rule out pulmonary embolism      CORRELATIVE IMAGING:  None                        TECHNIQUE:           - PE / vascular protocol             - reconstructions:  axial, coronal, sagittal, obliques   - computer-generated 3D reconstructions (MIPS) were performed.            - contrast:  intravenous 80 mL of Isovue-[id number]                     This exam was performed according to our departmental dose-optimization program, which includes automated exposure control, adjustment of the mA and/or kV according to patient size and/or use of iterative reconstruction technique. DLP is 199.7           COMMENTS:                           - Pulmonary arterial system:     - Main pulmonary artery trunk:  negative     - Left, right main pulmonary arteries: negative     - Lobar arteries: negative     - Segmental arteries: negative    - Systemic vascularity (as visualized):      - Aorta:  Atherosclerotic calcification, normal caliber / no dissection      - roots of great vessels:  grossly negative / normal caliber     - SVC / IVC:  grossly negative / normal caliber     - Misc (limited visualization):     - pulmonary parenchyma:  Emphysematous changes     - pleura:  negative     - mediastinal / alpesh:  negative     - neck, inferior:  grossly wnl     - subdiaphragmatic structures: Patient appears to be status post cholecystectomy. Hepatic parenchyma is diffusely  hypoattenuating, consistent with diffuse fatty infiltration, with the exception of a small area approximately 3 cm in greatest dimension adjacent to the falciform ligament which is hyperattenuating relative to background liver, and could represent an area of focal fatty sparing, but further evaluation is suggested for better characterization. - osseous:  Unremarkable for age   .      Impression: CONCLUSION: 1.  No evidence of pulmonary embolism.    2. Mild atherosclerotic vascular calcification 3. Diffusely fatty infiltrated liver, with a focal area of hyperattenuation adjacent to the falciform ligament, which may represent area of focal fatty sparing, but could be further evaluated and characterized with ultrasound and/or multiphasic contrast enhanced CT or MRI. 4. Emphysematous changes Electronically signed by:  Elena Munoz MD  7/20/2017 8:34 PM CDT Workstation: [id number]              [facility]    Final diagnoses:   Intermittent palpitations            Ketan Treviño MD  07/21/17 0138

## 2017-07-21 NOTE — PROGRESS NOTES
Subjective    Dallas Jaime is a 61 y.o. male. he is here today for follow-up.    History of Present Illness       60 y o male comes for followup     History of Thyroid Cancer  1999 - right follwed by left thyroidectomy followed by RUST ablation     Timing of hypothryoidism - constant, cancer in remission   Quality - controlled  Severity - moderate     Alleviating Factors - on levothyroxine 150 Mond Friday and  175 Sat Sund     ---------     Dyslipidemia on on lipitor 10 mg qhs ---stopped lipitor taking crestor   Timing constant  Duration Jan 2013  Labs reviewed, , HDL 70 ( risk factors age and smoking) s     ----------     also impaired fasting glucose   Dx Jan 2013  Timing - constant, verified May 2013  Jan 2013 2 bg fasting at 102          The following portions of the patient's history were reviewed and updated as appropriate:   Past Medical History:   Diagnosis Date   • Acute bronchitis    • Acute otitis media    • Allergic rhinitis    • Anorectal abscess    • Diverticular disease of colon    • External hemorrhoids without complication    • Foot pain     probable arthritis   • GERD (gastroesophageal reflux disease)    • Hallux valgus    • History of colon polyps    • History of malignant neoplasm of thyroid    • Hypercholesterolemia    • Hyperlipidemia    • Hypermetropia    • Impaired glucose tolerance    • Infective otitis externa     right ear   • Lip cancer    • Metatarsalgia    • Need for influenza vaccination    • Pain in eye     suspect neuralgia, TGN   • Postoperative hypothyroidism    • Presbyopia    • Sprain of knee     left   • Thyroid cancer     Follicular carcinoma.  Had both sides removed in 2 separate surgeries with radiation   • Tobacco dependence syndrome    • Upper respiratory infection    • Vitamin D deficiency    • Vitamin deficiency      Past Surgical History:   Procedure Laterality Date   • APPENDECTOMY     • COLONOSCOPY  01/18/2013    diverticulum is sigmoid colon &  descending colon.  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   • THYROID SURGERY     • THYROIDECTOMY Bilateral      Family History   Problem Relation Age of Onset   • Cancer Father    • Diabetes Mother    • Coronary artery disease Mother    • Diabetes type I Sister    • Cancer Other    • Coronary artery disease Other    • Diabetes Other    • Hypertension Other        Current Outpatient Prescriptions   Medication Sig Dispense Refill   • clonazePAM (KlonoPIN) 0.5 MG tablet 1 bid till seen again 90 tablet 1   • CRESTOR 20 MG tablet TAKE 1 TABLET DAILY (ANYTIME OF THE DAY) 90 tablet 2   • esomeprazole (nexIUM) 20 MG capsule Take 1 capsule by mouth Every Morning Before Breakfast. 90 capsule 1   • levothyroxine (SYNTHROID, LEVOTHROID) 150 MCG tablet TAKE 1 TABLET MONDAY THROUGH FRIDAY FOR THYROID 65 tablet 2   • levothyroxine (SYNTHROID, LEVOTHROID) 175 MCG tablet One tablet Saturday only     • metoprolol tartrate (LOPRESSOR) 25 MG tablet Take 0.5 tablets by mouth 2 (Two) Times a Day. 10 tablet 0   • vitamin D (ERGOCALCIFEROL) 89671 UNITS capsule capsule AT START OF THERAPY TAKE 1 CAPSULE EVERY WEEK FOR 12 WEEKS, THEN TAKE 1 CAPSULE EVERY 2 WEEKS THEREAFTER 6 capsule 5     No current facility-administered medications for this visit.      No Known Allergies  Social History     Social History   • Marital status:      Spouse name: N/A   • Number of children: N/A   • Years of education: N/A     Social History Main Topics   • Smoking status: Current Every Day Smoker     Packs/day: 1.00   • Smokeless tobacco: Never Used   • Alcohol use 2.4 - 3.0 oz/week     4 - 5 Cans of beer per week      Comment: per day   • Drug use: No  "  • Sexual activity: Not Asked     Other Topics Concern   • None     Social History Narrative       Review of Systems  Review of Systems   Constitutional: Negative for activity change, appetite change, chills, diaphoresis and fatigue.   HENT: Negative for congestion, dental problem, drooling, ear discharge, ear pain, facial swelling, sneezing, sore throat, tinnitus, trouble swallowing and voice change.    Eyes: Negative for photophobia, pain, discharge, redness, itching and visual disturbance.   Respiratory: Negative for apnea, cough, choking, chest tightness and shortness of breath.    Cardiovascular: Negative for chest pain, palpitations and leg swelling.   Gastrointestinal: Negative for abdominal distention, abdominal pain, constipation, diarrhea, nausea and vomiting.   Endocrine: Negative for cold intolerance, heat intolerance, polydipsia, polyphagia and polyuria.   Genitourinary: Negative for difficulty urinating, dysuria, frequency, hematuria and urgency.   Musculoskeletal: Negative for arthralgias, back pain, gait problem, joint swelling, myalgias, neck pain and neck stiffness.   Skin: Negative for color change, pallor, rash and wound.   Allergic/Immunologic: Negative for environmental allergies, food allergies and immunocompromised state.   Neurological: Negative for dizziness, tremors, facial asymmetry, weakness, light-headedness, numbness and headaches.   Hematological: Negative for adenopathy. Does not bruise/bleed easily.   Psychiatric/Behavioral: Negative for agitation, behavioral problems, confusion, decreased concentration and sleep disturbance.        Objective    /75 (BP Location: Right arm, Patient Position: Sitting, Cuff Size: Adult)  Pulse 76  Ht 69\" (175.3 cm)  Wt 188 lb 14.4 oz (85.7 kg)  BMI 27.9 kg/m2  Physical Exam   Constitutional: He is oriented to person, place, and time. He appears well-developed and well-nourished. No distress.   HENT:   Head: Normocephalic and atraumatic. "   Right Ear: External ear normal.   Left Ear: External ear normal.   Nose: Nose normal.   Eyes: Conjunctivae and EOM are normal. Pupils are equal, round, and reactive to light.   Neck: Normal range of motion. Neck supple. No tracheal deviation present.   Thyroid surgically absent   Cardiovascular: Normal rate, regular rhythm and normal heart sounds.    No murmur heard.  Pulmonary/Chest: Effort normal and breath sounds normal. No respiratory distress. He has no wheezes.   Abdominal: Soft. Bowel sounds are normal. There is no tenderness. There is no rebound and no guarding.   Musculoskeletal: Normal range of motion. He exhibits no edema, tenderness or deformity.   Neurological: He is alert and oriented to person, place, and time. No cranial nerve deficit.   Skin: Skin is warm and dry. No rash noted.   Psychiatric: He has a normal mood and affect. His behavior is normal. Judgment and thought content normal.       Lab Review  Glucose   Date Value   07/20/2017 98 mg/dL   03/02/2017 88 mg/dL   07/06/2016 100 mg/dl   12/07/2015 90 mg/dl   10/30/2014 71 mg/dl     Sodium (mmol/L)   Date Value   07/20/2017 144   03/02/2017 140   07/06/2016 144   12/07/2015 141   10/30/2014 141     Potassium (mmol/L)   Date Value   07/20/2017 3.6   03/02/2017 4.5   07/06/2016 4.7   12/07/2015 4.7   10/30/2014 5.0     Chloride (mmol/L)   Date Value   07/20/2017 102   03/02/2017 104   07/06/2016 103   12/07/2015 106   10/30/2014 103     CO2 (mmol/L)   Date Value   07/20/2017 24.0   03/02/2017 21.0 (L)   07/06/2016 26   12/07/2015 23   10/30/2014 27     BUN   Date Value   07/20/2017 7 mg/dL   03/02/2017 9 mg/dL   07/06/2016 6 mg/dl (L)   12/07/2015 11 mg/dl   10/30/2014 7 mg/dl     Creatinine   Date Value   07/20/2017 0.81 mg/dL   03/02/2017 0.69 mg/dL (L)   07/06/2016 0.8 mg/dl   12/07/2015 0.8 mg/dl   10/30/2014 0.8 mg/dl     Hemoglobin A1C (%TotHgb)   Date Value   12/07/2015 5.6     Triglycerides   Date Value   03/02/2017 132 mg/dL    07/06/2016 97 mg/dl   12/07/2015 81 mg/dl   10/30/2014 89 mg/dl       Assessment/Plan      1. Postoperative hypothyroidism    2. Thyroid cancer    3. Cigarette smoker    4. Impaired fasting glucose    .    Medications prescribed:  Outpatient Encounter Prescriptions as of 7/21/2017   Medication Sig Dispense Refill   • clonazePAM (KlonoPIN) 0.5 MG tablet 1 bid till seen again 90 tablet 1   • CRESTOR 20 MG tablet TAKE 1 TABLET DAILY (ANYTIME OF THE DAY) 90 tablet 2   • esomeprazole (nexIUM) 20 MG capsule Take 1 capsule by mouth Every Morning Before Breakfast. 90 capsule 1   • levothyroxine (SYNTHROID, LEVOTHROID) 150 MCG tablet TAKE 1 TABLET MONDAY THROUGH FRIDAY FOR THYROID 65 tablet 2   • levothyroxine (SYNTHROID, LEVOTHROID) 175 MCG tablet One tablet Saturday only     • metoprolol tartrate (LOPRESSOR) 25 MG tablet Take 0.5 tablets by mouth 2 (Two) Times a Day. 10 tablet 0   • vitamin D (ERGOCALCIFEROL) 90290 UNITS capsule capsule AT START OF THERAPY TAKE 1 CAPSULE EVERY WEEK FOR 12 WEEKS, THEN TAKE 1 CAPSULE EVERY 2 WEEKS THEREAFTER 6 capsule 5     Facility-Administered Encounter Medications as of 7/21/2017   Medication Dose Route Frequency Provider Last Rate Last Dose   • [COMPLETED] aspirin tablet 325 mg  325 mg Oral Once Ketan Treviño MD   325 mg at 07/20/17 1912   • [COMPLETED] iopamidol (ISOVUE-370) 76 % injection 80 mL  80 mL Intravenous Once in imaging Ketan Treviño MD   80 mL at 07/20/17 2007   • [COMPLETED] ipratropium-albuterol (DUO-NEB) nebulizer solution 3 mL  3 mL Nebulization Once Ketan Treviño MD   3 mL at 07/20/17 1912   • [COMPLETED] metoprolol tartrate (LOPRESSOR) half tablet 12.5 mg  12.5 mg Oral Once Ketan Treviño MD   12.5 mg at 07/20/17 2213   • [COMPLETED] sodium chloride 0.9 % bolus 500 mL  500 mL Intravenous Once Ketan Treviño MD   Stopped at 07/20/17 2127       Orders placed during this encounter include:  Orders Placed This Encounter   Procedures   • TSH   • Comprehensive Metabolic Panel   •  Hemoglobin A1c   • Thyroglobulin With Anti-TG   • CBC & Differential     Order Specific Question:   Manual Differential     Answer:   No   Plan Details  previous records from [facility] dating back to 2012 were requested, obtained and reviewed.      Thyroid cancer  initial tx , surgery and RUST ablation in 1999     euthyroid - cont same LT4 regimen described in HPI      tg - normal     tg - pending at time of visit     Dec. 2015     TSH - 0.18     keep 150mcg Mon - Friday  keep 175mcgs onSaturday and take none on Sunday June 2016     tg <1  tg ab <0.1     March 2017     TSH - 2     Tg ab <1.0  Tg <0.1     Keep current regimen    Lab Results   Component Value Date    TSH 4.870 (H) 07/20/2017     Keep 150 Monday through Friday  Keep 175 mcg on Saturday and add 1/2 on Sunday     ------------     Dyslipidemia     started on lipitor in Jan 2013 but experiencing myalgias.      Since initial LDL was 170 and he has prediabetes his target LDL should be less than 100 and that requires a 40% reduction. No other statin but lipitor or crestor will be sufficient.     I wrote letter for crestor instead of lipitor     taking crestor -- continue     Oct. 2014     total chol -178  TG- 89  HDL- 71  LDL -89     March 2017     LDL - 90      Elevated liver enzymes - states drinking to much beer-- does not want referral to GI     ---------------     Prediabetes, not obese, no need for metfomin at this point  May 2013- HgbA1c of 5.7% , IFG confirmed ( 3 bg above 99)     Dec. 2015     HgbA1c 5.6%     ---------------     Vit D Def  levels of 20 in May 2013     contiue vit D replacement     not taking     Dec. 2015     vitd - 27     please restart vit d     Not taking     March 2017     Vitamin d - 21.1             4. Follow-up: Return in about 5 weeks (around 8/25/2017) for Recheck.

## 2017-07-21 NOTE — ED NOTES
18 gauge IV removed from left lateral forearm, discharge instructions and medication reviewed.      Autumn Bolanos RN  07/20/17 8912

## 2017-08-11 ENCOUNTER — TRANSCRIBE ORDERS (OUTPATIENT)
Dept: CARDIAC SURGERY | Facility: CLINIC | Age: 62
End: 2017-08-11

## 2017-08-11 DIAGNOSIS — R00.2 PALPITATIONS: Primary | ICD-10-CM

## 2017-08-11 DIAGNOSIS — R53.83 LACKING IN ENERGY: ICD-10-CM

## 2017-08-11 RX ORDER — ROSUVASTATIN CALCIUM 20 MG/1
TABLET, COATED ORAL
Qty: 90 TABLET | Refills: 1 | Status: SHIPPED | OUTPATIENT
Start: 2017-08-11

## 2017-08-30 ENCOUNTER — LAB (OUTPATIENT)
Dept: LAB | Facility: HOSPITAL | Age: 62
End: 2017-08-30

## 2017-08-30 DIAGNOSIS — E89.0 POSTOPERATIVE HYPOTHYROIDISM: ICD-10-CM

## 2017-08-30 DIAGNOSIS — R73.01 IMPAIRED FASTING GLUCOSE: ICD-10-CM

## 2017-08-30 DIAGNOSIS — E78.49 OTHER HYPERLIPIDEMIA: ICD-10-CM

## 2017-08-30 DIAGNOSIS — E55.9 VITAMIN D DEFICIENCY: ICD-10-CM

## 2017-08-30 DIAGNOSIS — Z11.59 NEED FOR HEPATITIS C SCREENING TEST: ICD-10-CM

## 2017-08-30 DIAGNOSIS — C73 THYROID CANCER (HCC): ICD-10-CM

## 2017-08-30 LAB
25(OH)D3 SERPL-MCNC: 25.2 NG/ML (ref 30–100)
ALBUMIN SERPL-MCNC: 4.6 G/DL (ref 3.4–4.8)
ALBUMIN/GLOB SERPL: 1.4 G/DL (ref 1.1–1.8)
ALP SERPL-CCNC: 53 U/L (ref 38–126)
ALT SERPL W P-5'-P-CCNC: 137 U/L (ref 21–72)
ANION GAP SERPL CALCULATED.3IONS-SCNC: 14 MMOL/L (ref 5–15)
AST SERPL-CCNC: 211 U/L (ref 17–59)
BASOPHILS # BLD AUTO: 0.03 10*3/MM3 (ref 0–0.2)
BASOPHILS NFR BLD AUTO: 0.5 % (ref 0–2)
BILIRUB SERPL-MCNC: 0.8 MG/DL (ref 0.2–1.3)
BUN BLD-MCNC: 8 MG/DL (ref 7–21)
BUN/CREAT SERPL: 10 (ref 7–25)
CALCIUM SPEC-SCNC: 9.5 MG/DL (ref 8.4–10.2)
CHLORIDE SERPL-SCNC: 103 MMOL/L (ref 95–110)
CO2 SERPL-SCNC: 26 MMOL/L (ref 22–31)
CREAT BLD-MCNC: 0.8 MG/DL (ref 0.7–1.3)
DEPRECATED RDW RBC AUTO: 52.4 FL (ref 35.1–43.9)
EOSINOPHIL # BLD AUTO: 0.1 10*3/MM3 (ref 0–0.7)
EOSINOPHIL NFR BLD AUTO: 1.6 % (ref 0–7)
ERYTHROCYTE [DISTWIDTH] IN BLOOD BY AUTOMATED COUNT: 16.3 % (ref 11.5–14.5)
GFR SERPL CREATININE-BSD FRML MDRD: 98 ML/MIN/1.73 (ref 49–113)
GLOBULIN UR ELPH-MCNC: 3.3 GM/DL (ref 2.3–3.5)
GLUCOSE BLD-MCNC: 95 MG/DL (ref 60–100)
HAV IGM SERPL QL IA: NEGATIVE
HBA1C MFR BLD: 6.6 % (ref 4–5.6)
HBV CORE IGM SERPL QL IA: NEGATIVE
HBV SURFACE AG SERPL QL IA: NEGATIVE
HCT VFR BLD AUTO: 44.6 % (ref 39–49)
HCV AB SER DONR QL: NEGATIVE
HGB BLD-MCNC: 15.2 G/DL (ref 13.7–17.3)
IMM GRANULOCYTES # BLD: 0.01 10*3/MM3 (ref 0–0.02)
IMM GRANULOCYTES NFR BLD: 0.2 % (ref 0–0.5)
LYMPHOCYTES # BLD AUTO: 1.44 10*3/MM3 (ref 0.6–4.2)
LYMPHOCYTES NFR BLD AUTO: 23.7 % (ref 10–50)
MCH RBC QN AUTO: 30.5 PG (ref 26.5–34)
MCHC RBC AUTO-ENTMCNC: 34.1 G/DL (ref 31.5–36.3)
MCV RBC AUTO: 89.6 FL (ref 80–98)
MONOCYTES # BLD AUTO: 0.47 10*3/MM3 (ref 0–0.9)
MONOCYTES NFR BLD AUTO: 7.7 % (ref 0–12)
NEUTROPHILS # BLD AUTO: 4.02 10*3/MM3 (ref 2–8.6)
NEUTROPHILS NFR BLD AUTO: 66.3 % (ref 37–80)
PLATELET # BLD AUTO: 179 10*3/MM3 (ref 150–450)
PMV BLD AUTO: 11 FL (ref 8–12)
POTASSIUM BLD-SCNC: 4.7 MMOL/L (ref 3.5–5.1)
PROT SERPL-MCNC: 7.9 G/DL (ref 6.3–8.6)
RBC # BLD AUTO: 4.98 10*6/MM3 (ref 4.37–5.74)
SODIUM BLD-SCNC: 143 MMOL/L (ref 137–145)
TSH SERPL DL<=0.05 MIU/L-ACNC: 9.14 MIU/ML (ref 0.46–4.68)
WBC NRBC COR # BLD: 6.07 10*3/MM3 (ref 3.2–9.8)

## 2017-08-30 PROCEDURE — 85025 COMPLETE CBC W/AUTO DIFF WBC: CPT | Performed by: NURSE PRACTITIONER

## 2017-08-30 PROCEDURE — 84443 ASSAY THYROID STIM HORMONE: CPT | Performed by: NURSE PRACTITIONER

## 2017-08-30 PROCEDURE — 80053 COMPREHEN METABOLIC PANEL: CPT | Performed by: NURSE PRACTITIONER

## 2017-08-30 PROCEDURE — 83036 HEMOGLOBIN GLYCOSYLATED A1C: CPT | Performed by: NURSE PRACTITIONER

## 2017-08-30 PROCEDURE — 86800 THYROGLOBULIN ANTIBODY: CPT | Performed by: NURSE PRACTITIONER

## 2017-08-30 PROCEDURE — 84432 ASSAY OF THYROGLOBULIN: CPT

## 2017-08-30 PROCEDURE — 80074 ACUTE HEPATITIS PANEL: CPT | Performed by: FAMILY MEDICINE

## 2017-08-30 PROCEDURE — 82306 VITAMIN D 25 HYDROXY: CPT | Performed by: NURSE PRACTITIONER

## 2017-08-30 PROCEDURE — 36415 COLL VENOUS BLD VENIPUNCTURE: CPT

## 2017-08-31 ENCOUNTER — TELEPHONE (OUTPATIENT)
Dept: ENDOCRINOLOGY | Facility: CLINIC | Age: 62
End: 2017-08-31

## 2017-08-31 LAB
THYROGLOB AB SERPL-ACNC: <1 IU/ML (ref 0–0.9)
THYROGLOBULIN BY IMA: <0.1 NG/ML (ref 1.4–29.2)

## 2017-09-01 ENCOUNTER — OFFICE VISIT (OUTPATIENT)
Dept: ENDOCRINOLOGY | Facility: CLINIC | Age: 62
End: 2017-09-01

## 2017-09-01 VITALS
SYSTOLIC BLOOD PRESSURE: 138 MMHG | HEART RATE: 102 BPM | DIASTOLIC BLOOD PRESSURE: 80 MMHG | BODY MASS INDEX: 28.29 KG/M2 | HEIGHT: 69 IN | WEIGHT: 191 LBS

## 2017-09-01 DIAGNOSIS — C73 THYROID CANCER (HCC): Chronic | ICD-10-CM

## 2017-09-01 DIAGNOSIS — F17.210 CIGARETTE SMOKER: Chronic | ICD-10-CM

## 2017-09-01 DIAGNOSIS — E89.0 POSTOPERATIVE HYPOTHYROIDISM: Primary | Chronic | ICD-10-CM

## 2017-09-01 DIAGNOSIS — R73.01 IMPAIRED FASTING GLUCOSE: Chronic | ICD-10-CM

## 2017-09-01 PROCEDURE — 95250 CONT GLUC MNTR PHYS/QHP EQP: CPT | Performed by: NURSE PRACTITIONER

## 2017-09-01 PROCEDURE — 99214 OFFICE O/P EST MOD 30 MIN: CPT | Performed by: NURSE PRACTITIONER

## 2017-09-01 RX ORDER — LEVOTHYROXINE SODIUM 175 UG/1
175 TABLET ORAL DAILY
Qty: 90 TABLET | Refills: 3 | Status: SHIPPED | OUTPATIENT
Start: 2017-09-01

## 2017-09-01 RX ORDER — MAGNESIUM OXIDE 400 MG/1
400 TABLET ORAL 2 TIMES DAILY
COMMUNITY

## 2017-09-01 NOTE — PROGRESS NOTES
Subjective    Dallas Jaime is a 61 y.o. male. he is here today for follow-up.    History of Present Illness       60 y o male comes for followup     History of Thyroid Cancer  1999 - right follwed by left thyroidectomy followed by RUST ablation     Timing of hypothryoidism - constant, cancer in remission   Quality - controlled  Severity - moderate     Alleviating Factors - on levothyroxine 150 Mond Friday and  175 Sat Sund     ---------     Dyslipidemia on on lipitor 10 mg qhs ---stopped lipitor taking crestor   Timing constant  Duration Jan 2013  Labs reviewed, , HDL 70 ( risk factors age and smoking) s     ----------     also impaired fasting glucose   Dx Jan 2013  Timing - constant, verified May 2013  Jan 2013 2 bg fasting at 102            The following portions of the patient's history were reviewed and updated as appropriate:   Past Medical History:   Diagnosis Date   • Acute bronchitis    • Acute otitis media    • Allergic rhinitis    • Anorectal abscess    • Diverticular disease of colon    • External hemorrhoids without complication    • Foot pain     probable arthritis   • GERD (gastroesophageal reflux disease)    • Hallux valgus    • History of colon polyps    • History of malignant neoplasm of thyroid    • Hypercholesterolemia    • Hyperlipidemia    • Hypermetropia    • Impaired glucose tolerance    • Infective otitis externa     right ear   • Lip cancer    • Metatarsalgia    • Need for influenza vaccination    • Pain in eye     suspect neuralgia, TGN   • Postoperative hypothyroidism    • Presbyopia    • Sprain of knee     left   • Thyroid cancer     Follicular carcinoma.  Had both sides removed in 2 separate surgeries with radiation   • Tobacco dependence syndrome    • Upper respiratory infection    • Vitamin D deficiency    • Vitamin deficiency      Past Surgical History:   Procedure Laterality Date   • APPENDECTOMY     • COLONOSCOPY  01/18/2013    diverticulum is sigmoid colon &  descending colon.  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 90 capsule 1   • levothyroxine (SYNTHROID, LEVOTHROID) 150 MCG tablet TAKE 1 TABLET MONDAY THROUGH FRIDAY FOR THYROID 65 tablet 2   • levothyroxine (SYNTHROID, LEVOTHROID) 175 MCG tablet Take 1 tablet by mouth Daily. One tablet Saturday only 90 tablet 3   • magnesium oxide (MAG-OX) 400 MG tablet Take 400 mg by mouth 2 (Two) Times a Day.     • metoprolol tartrate (LOPRESSOR) 25 MG tablet Take 0.5 tablets by mouth 2 (Two) Times a Day. 10 tablet 0   • rosuvastatin (CRESTOR) 20 MG tablet TAKE 1 TABLET DAILY (ANYTIME OF THE DAY) 90 tablet 1   • vitamin D (ERGOCALCIFEROL) 53054 UNITS capsule capsule AT START OF THERAPY TAKE 1 CAPSULE EVERY WEEK FOR 12 WEEKS, THEN TAKE 1 CAPSULE EVERY 2 WEEKS THEREAFTER 6 capsule 5     No current facility-administered medications for this visit.      No Known Allergies  Social History     Social History   • Marital status:      Spouse name: N/A   • Number of children: N/A   • Years of education: N/A     Social History Main Topics   • Smoking status: Current Every Day Smoker     Packs/day:  "1.00   • Smokeless tobacco: Never Used      Comment: encouraged smoking cessaiton   • Alcohol use 2.4 - 3.0 oz/week     4 - 5 Cans of beer per week      Comment: per day   • Drug use: No   • Sexual activity: Not Asked     Other Topics Concern   • None     Social History Narrative       Review of Systems  Review of Systems   Constitutional: Negative for activity change, appetite change, diaphoresis and fatigue.   HENT: Negative for congestion, dental problem, drooling, facial swelling, sneezing, sore throat, tinnitus, trouble swallowing and voice change.    Eyes: Negative for photophobia, pain, discharge, redness, itching and visual disturbance.   Respiratory: Negative for apnea, cough, choking, chest tightness and shortness of breath.    Cardiovascular: Negative for chest pain, palpitations and leg swelling.   Gastrointestinal: Negative for abdominal distention, abdominal pain, constipation, diarrhea, nausea and vomiting.   Endocrine: Negative for cold intolerance, heat intolerance, polydipsia, polyphagia and polyuria.   Genitourinary: Negative for difficulty urinating, dysuria, frequency, hematuria and urgency.   Musculoskeletal: Negative for arthralgias, back pain, gait problem, joint swelling, myalgias, neck pain and neck stiffness.   Skin: Negative for color change, pallor, rash and wound.   Allergic/Immunologic: Negative for environmental allergies, food allergies and immunocompromised state.   Neurological: Negative for dizziness, tremors, facial asymmetry, weakness, light-headedness, numbness and headaches.   Hematological: Negative for adenopathy. Does not bruise/bleed easily.   Psychiatric/Behavioral: Negative for agitation, behavioral problems, confusion and sleep disturbance.        Objective    /80 (BP Location: Right arm, Patient Position: Sitting, Cuff Size: Adult)  Pulse 102  Ht 69\" (175.3 cm)  Wt 191 lb (86.6 kg)  BMI 28.21 kg/m2  Physical Exam   Constitutional: He is oriented to person, " place, and time. He appears well-developed and well-nourished. No distress.   HENT:   Head: Normocephalic and atraumatic.   Right Ear: External ear normal.   Left Ear: External ear normal.   Nose: Nose normal.   Eyes: Conjunctivae and EOM are normal. Pupils are equal, round, and reactive to light.   Neck: Normal range of motion. Neck supple. No tracheal deviation present.   Thyroid surgically absent   Cardiovascular: Normal rate, regular rhythm and normal heart sounds.    No murmur heard.  Pulmonary/Chest: Effort normal and breath sounds normal. No respiratory distress. He has no wheezes.   Abdominal: Soft. Bowel sounds are normal. There is no tenderness. There is no rebound and no guarding.   Musculoskeletal: Normal range of motion. He exhibits no edema, tenderness or deformity.   Neurological: He is alert and oriented to person, place, and time. No cranial nerve deficit.   Skin: Skin is warm and dry. No rash noted.   Psychiatric: He has a normal mood and affect. His behavior is normal. Judgment and thought content normal.       Lab Review  Glucose (mg/dL)   Date Value   08/30/2017 95   07/20/2017 98   03/02/2017 88     Sodium (mmol/L)   Date Value   08/30/2017 143   07/20/2017 144   03/02/2017 140     Potassium (mmol/L)   Date Value   08/30/2017 4.7   07/20/2017 3.6   03/02/2017 4.5     Chloride (mmol/L)   Date Value   08/30/2017 103   07/20/2017 102   03/02/2017 104     CO2 (mmol/L)   Date Value   08/30/2017 26.0   07/20/2017 24.0   03/02/2017 21.0 (L)     BUN (mg/dL)   Date Value   08/30/2017 8   07/20/2017 7   03/02/2017 9     Creatinine (mg/dL)   Date Value   08/30/2017 0.80   07/20/2017 0.81   03/02/2017 0.69 (L)     Hemoglobin A1C   Date Value   08/30/2017 6.6 % (H)   12/07/2015 5.6 %TotHgb     Triglycerides   Date Value   03/02/2017 132 mg/dL   07/06/2016 97 mg/dl   12/07/2015 81 mg/dl   10/30/2014 89 mg/dl       Assessment/Plan      1. Postoperative hypothyroidism    2. Thyroid cancer    3. Impaired  fasting glucose    4. Cigarette smoker    .    Medications prescribed:  Outpatient Encounter Prescriptions as of 9/1/2017   Medication Sig Dispense Refill   • clonazePAM (KlonoPIN) 0.5 MG tablet 1 bid till seen again 90 tablet 1   • esomeprazole (nexIUM) 20 MG capsule Take 1 capsule by mouth Every Morning Before Breakfast. 90 capsule 1   • levothyroxine (SYNTHROID, LEVOTHROID) 150 MCG tablet TAKE 1 TABLET MONDAY THROUGH FRIDAY FOR THYROID 65 tablet 2   • levothyroxine (SYNTHROID, LEVOTHROID) 175 MCG tablet Take 1 tablet by mouth Daily. One tablet Saturday only 90 tablet 3   • magnesium oxide (MAG-OX) 400 MG tablet Take 400 mg by mouth 2 (Two) Times a Day.     • metoprolol tartrate (LOPRESSOR) 25 MG tablet Take 0.5 tablets by mouth 2 (Two) Times a Day. 10 tablet 0   • rosuvastatin (CRESTOR) 20 MG tablet TAKE 1 TABLET DAILY (ANYTIME OF THE DAY) 90 tablet 1   • vitamin D (ERGOCALCIFEROL) 16379 UNITS capsule capsule AT START OF THERAPY TAKE 1 CAPSULE EVERY WEEK FOR 12 WEEKS, THEN TAKE 1 CAPSULE EVERY 2 WEEKS THEREAFTER 6 capsule 5   • [DISCONTINUED] levothyroxine (SYNTHROID, LEVOTHROID) 175 MCG tablet One tablet Saturday only       No facility-administered encounter medications on file as of 9/1/2017.        Orders placed during this encounter include:  Orders Placed This Encounter   Procedures   • TSH     Standing Status:   Future   • Hemoglobin A1c     Standing Status:   Future     Thyroid cancer  initial tx , surgery and RUST ablation in 1999     euthyroid - cont same LT4 regimen described in HPI      tg - normal     tg - pending at time of visit     Dec. 2015     TSH - 0.18     keep 150mcg Mon - Friday  keep 175mcgs onSaturday and take none on Sunday June 2016     tg <1  tg ab <0.1     March 2017     TSH - 2     Tg ab <1.0  Tg <0.1     Keep current regimen           Lab Results   Component Value Date     TSH 4.870 (H) 07/20/2017      Keep 150 Monday through Friday  Keep 175 mcg on Saturday and add 1/2 on Sunday        Lab Results   Component Value Date    TSH 9.140 (H) 08/30/2017     Change to 175 mcg one daily      ------------     Dyslipidemia     started on lipitor in Jan 2013 but experiencing myalgias.      Since initial LDL was 170 and he has prediabetes his target LDL should be less than 100 and that requires a 40% reduction. No other statin but lipitor or crestor will be sufficient.     I wrote letter for crestor instead of lipitor     taking crestor -- continue     Oct. 2014     total chol -178  TG- 89  HDL- 71  LDL -89     March 2017     LDL - 90      Elevated liver enzymes - states drinking to much beer-- does not want referral to GI     ---------------     Prediabetes, not obese, no need for metfomin at this point  May 2013- HgbA1c of 5.7% , IFG confirmed ( 3 bg above 99)     Dec. 2015     HgbA1c 5.6%    Lab Results   Component Value Date    HGBA1C 6.6 (H) 08/30/2017       wear clifford sensor for two weeks   ---------------     Vit D Def  levels of 20 in May 2013     contiue vit D replacement     not taking     Dec. 2015     vitd - 27     please restart vit d     Not taking     March 2017     Vitamin d - 21.1    augsut 2017    Vitamin d 25    Has been taking vitamin d   Preventive care    Smoking cessation discussed         4. Follow-up: Return in about 5 weeks (around 10/6/2017).

## 2017-09-06 ENCOUNTER — OFFICE VISIT (OUTPATIENT)
Dept: FAMILY MEDICINE CLINIC | Facility: CLINIC | Age: 62
End: 2017-09-06

## 2017-09-06 VITALS
DIASTOLIC BLOOD PRESSURE: 78 MMHG | HEIGHT: 69 IN | BODY MASS INDEX: 27.81 KG/M2 | WEIGHT: 187.8 LBS | SYSTOLIC BLOOD PRESSURE: 110 MMHG

## 2017-09-06 DIAGNOSIS — F41.0 PANIC DISORDER: Primary | Chronic | ICD-10-CM

## 2017-09-06 PROCEDURE — 99213 OFFICE O/P EST LOW 20 MIN: CPT | Performed by: FAMILY MEDICINE

## 2017-09-06 RX ORDER — CLONAZEPAM 0.5 MG/1
TABLET ORAL
Qty: 90 TABLET | Refills: 1 | Status: SHIPPED | OUTPATIENT
Start: 2017-09-06 | End: 2018-01-02 | Stop reason: SDUPTHER

## 2017-09-06 NOTE — PROGRESS NOTES
Subjective   Dallas Jaime is a 61 y.o. male.     History of Present Illness  reevaluation panic disorder.  On current medicine of clonazepam 0.5 mg daily at bedtime only now.  Time for refill.  Is seeing endocrinology regards to a blood sugar thyroid.  Is up-to-date on all screenings.  Declines flu vaccine.  Feels well on current medicines.    The following portions of the patient's history were reviewed and updated as appropriate: allergies, current medications, past family history, past medical history, past social history, past surgical history and problem list.    Review of Systems   Constitutional: Negative for activity change, appetite change, fatigue and unexpected weight change.   HENT: Negative for trouble swallowing and voice change.    Eyes: Negative for redness and visual disturbance.   Respiratory: Negative for cough and wheezing.    Cardiovascular: Negative for chest pain and palpitations.   Gastrointestinal: Negative for abdominal pain, constipation, diarrhea, nausea and vomiting.   Genitourinary: Negative for urgency.   Musculoskeletal: Negative for joint swelling.   Neurological: Negative for syncope and headaches.   Hematological: Negative for adenopathy.   Psychiatric/Behavioral: Negative for sleep disturbance.       Objective   Physical Exam   Constitutional: He appears well-developed.   HENT:   Head: Normocephalic.   Eyes: Pupils are equal, round, and reactive to light.   Neck: Normal range of motion.   Cardiovascular: Normal rate.    Psychiatric: He has a normal mood and affect. His speech is normal and behavior is normal.       Assessment/Plan   D[place] was seen today for follow-up.    Diagnoses and all orders for this visit:    Panic disorder  -     clonazePAM (KlonoPIN) 0.5 MG tablet; 1qhs       Is same recheck 6 months

## 2017-09-12 RX ORDER — ESOMEPRAZOLE MAGNESIUM 20 MG
CAPSULE,DELAYED RELEASE (ENTERIC COATED) ORAL
Qty: 90 CAPSULE | Refills: 1 | Status: SHIPPED | OUTPATIENT
Start: 2017-09-12

## 2017-09-14 ENCOUNTER — TELEPHONE (OUTPATIENT)
Dept: ENDOCRINOLOGY | Facility: CLINIC | Age: 62
End: 2017-09-14

## 2017-09-14 ENCOUNTER — TREATMENT (OUTPATIENT)
Dept: ENDOCRINOLOGY | Facility: CLINIC | Age: 62
End: 2017-09-14

## 2017-09-14 DIAGNOSIS — R73.01 IMPAIRED FASTING GLUCOSE: Chronic | ICD-10-CM

## 2017-09-14 PROCEDURE — 95251 CONT GLUC MNTR ANALYSIS I&R: CPT | Performed by: NURSE PRACTITIONER

## 2017-09-14 NOTE — TELEPHONE ENCOUNTER
----- Message from YI Marcelo sent at 9/14/2017 12:39 PM CDT -----  Let him know the sensor reading was 101   Average a1c based on sensor 5.1%  No medication needed

## 2017-09-14 NOTE — PROGRESS NOTES
Impaired fasting glucose    Last A1c was diabetic range    He states sugar is never high     So wore CGMS    Maki sensor    Wore from September 1 through 14, 2017    Average reading 101    Estimated A1c 5.1%    No treatment indicated at this time

## 2017-09-22 ENCOUNTER — LAB (OUTPATIENT)
Dept: LAB | Facility: HOSPITAL | Age: 62
End: 2017-09-22

## 2017-09-22 ENCOUNTER — TELEPHONE (OUTPATIENT)
Dept: ENDOCRINOLOGY | Facility: CLINIC | Age: 62
End: 2017-09-22

## 2017-09-22 DIAGNOSIS — C73 THYROID CANCER (HCC): Chronic | ICD-10-CM

## 2017-09-22 DIAGNOSIS — E89.0 POSTOPERATIVE HYPOTHYROIDISM: Chronic | ICD-10-CM

## 2017-09-22 DIAGNOSIS — R73.01 IMPAIRED FASTING GLUCOSE: Chronic | ICD-10-CM

## 2017-09-22 LAB
HBA1C MFR BLD: 6.7 % (ref 4–5.6)
TSH SERPL DL<=0.05 MIU/L-ACNC: 2.01 MIU/ML (ref 0.46–4.68)

## 2017-09-22 PROCEDURE — 36415 COLL VENOUS BLD VENIPUNCTURE: CPT

## 2017-09-22 PROCEDURE — 83036 HEMOGLOBIN GLYCOSYLATED A1C: CPT | Performed by: NURSE PRACTITIONER

## 2017-09-22 PROCEDURE — 84443 ASSAY THYROID STIM HORMONE: CPT | Performed by: NURSE PRACTITIONER

## 2017-09-22 NOTE — TELEPHONE ENCOUNTER
----- Message from YI Marcelo sent at 9/22/2017 10:58 AM CDT -----  Call patient with result-TSH is 2

## 2017-11-06 ENCOUNTER — TELEPHONE (OUTPATIENT)
Dept: FAMILY MEDICINE CLINIC | Facility: CLINIC | Age: 62
End: 2017-11-06

## 2018-01-02 ENCOUNTER — OFFICE VISIT (OUTPATIENT)
Dept: FAMILY MEDICINE CLINIC | Facility: CLINIC | Age: 63
End: 2018-01-02

## 2018-01-02 VITALS
WEIGHT: 198.4 LBS | DIASTOLIC BLOOD PRESSURE: 90 MMHG | HEIGHT: 69 IN | BODY MASS INDEX: 29.38 KG/M2 | SYSTOLIC BLOOD PRESSURE: 148 MMHG

## 2018-01-02 DIAGNOSIS — F41.0 PANIC DISORDER: Primary | Chronic | ICD-10-CM

## 2018-01-02 DIAGNOSIS — F17.210 CIGARETTE SMOKER: Chronic | ICD-10-CM

## 2018-01-02 PROCEDURE — 99213 OFFICE O/P EST LOW 20 MIN: CPT | Performed by: FAMILY MEDICINE

## 2018-01-02 RX ORDER — CLONAZEPAM 0.5 MG/1
TABLET ORAL
Qty: 90 TABLET | Refills: 1 | Status: SHIPPED | OUTPATIENT
Start: 2018-01-02

## 2018-01-02 NOTE — PROGRESS NOTES
Subjective   D[place] Jaime is a 62 y.o. male.     History of Present Illness  evaluation panic disorder tobacco abuse.  Patient is continuing to [place] within the next 2-3 weeks.  [facility].  Does need a refill on clonazepam till then.  Is up-to-date on same.   smoking is in the past.  Overall stable.    The following portions of the patient's history were reviewed and updated as appropriate: allergies, current medications, past family history, past medical history, past social history, past surgical history and problem list.    Review of Systems   Constitutional: Negative for activity change, appetite change, fatigue and unexpected weight change.   HENT: Negative for trouble swallowing and voice change.    Eyes: Negative for redness and visual disturbance.   Respiratory: Negative for cough and wheezing.    Cardiovascular: Negative for chest pain and palpitations.   Gastrointestinal: Negative for abdominal pain, constipation, diarrhea, nausea and vomiting.   Genitourinary: Negative for urgency.   Musculoskeletal: Negative for joint swelling.   Neurological: Negative for syncope and headaches.   Hematological: Negative for adenopathy.   Psychiatric/Behavioral: Negative for sleep disturbance.       Objective   Physical Exam   Constitutional: He appears well-developed.   HENT:   Head: Normocephalic.   Eyes: Pupils are equal, round, and reactive to light.   Neck: Normal range of motion.   Psychiatric: He has a normal mood and affect. His behavior is normal. Thought content normal.       Assessment/Plan   D[place] was seen today for anxiety.    Diagnoses and all orders for this visit:    Panic disorder  -     clonazePAM (KlonoPIN) 0.5 MG tablet; 1qhs    Cigarette smoker     Plan as above

## 2020-04-06 ENCOUNTER — HOSPITAL ENCOUNTER (OUTPATIENT)
Dept: HOSPITAL 90 - SHCH | Age: 65
Discharge: HOME | End: 2020-04-06
Attending: INTERNAL MEDICINE
Payer: MEDICARE

## 2020-04-06 DIAGNOSIS — I71.4: Primary | ICD-10-CM

## 2020-04-06 DIAGNOSIS — I70.0: ICD-10-CM

## 2020-04-06 PROCEDURE — 93978 VASCULAR STUDY: CPT

## 2022-02-11 ENCOUNTER — HOSPITAL ENCOUNTER (EMERGENCY)
Dept: HOSPITAL 90 - EDH | Age: 67
Discharge: HOME | End: 2022-02-11
Payer: MEDICARE

## 2022-02-11 VITALS — DIASTOLIC BLOOD PRESSURE: 86 MMHG | SYSTOLIC BLOOD PRESSURE: 143 MMHG

## 2022-02-11 VITALS — WEIGHT: 199.96 LBS | BODY MASS INDEX: 29.62 KG/M2 | HEIGHT: 69 IN
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
## 2022-02-11 DIAGNOSIS — I10: ICD-10-CM

## 2022-02-11 DIAGNOSIS — Z90.89: ICD-10-CM

## 2022-02-11 DIAGNOSIS — Z20.822: ICD-10-CM

## 2022-02-11 DIAGNOSIS — Z90.49: ICD-10-CM

## 2022-02-11 DIAGNOSIS — F10.129: Primary | ICD-10-CM

## 2022-02-11 DIAGNOSIS — Z98.890: ICD-10-CM

## 2022-02-11 LAB
ALBUMIN SERPL-MCNC: 4.2 G/DL (ref 3.5–5)
ALT SERPL-CCNC: 57 U/L (ref 12–78)
AMPHETAMINES UR QL SCN: NEGATIVE
APAP SERPL-MCNC: < 1 MCG/ML (ref 10–29)
AST SERPL-CCNC: 63 U/L (ref 10–37)
BARBITURATES UR QL SCN: NEGATIVE
BASOPHILS NFR BLD AUTO: 0.5 % (ref 0–5)
BENZODIAZ UR QL SCN: POSITIVE
BILIRUB SERPL-MCNC: 0.4 MG/DL (ref 0.2–1)
BUN SERPL-MCNC: 21 MG/DL (ref 7–18)
BZE UR QL SCN: NEGATIVE
CANNABINOIDS UR QL SCN: NEGATIVE
CHLORIDE SERPL-SCNC: 97 MMOL/L (ref 101–111)
CK SERPL-CCNC: 63 U/L (ref 21–232)
CK SERPL-CCNC: 68 U/L (ref 21–232)
CO2 SERPL-SCNC: 25 MMOL/L (ref 21–32)
CREAT SERPL-MCNC: 0.9 MG/DL (ref 0.5–1.5)
EOSINOPHIL NFR BLD AUTO: 0.3 % (ref 0–8)
ERYTHROCYTE [DISTWIDTH] IN BLOOD BY AUTOMATED COUNT: 18.3 % (ref 11–15.5)
ETHANOL SERPL-MCNC: 230 MG/DL (ref 0–10)
ETHANOL SERPL-MCNC: 282 MG/DL (ref 0–10)
GFR SERPL CREATININE-BSD FRML MDRD: 90 ML/MIN (ref 60–?)
GLUCOSE SERPL-MCNC: 107 MG/DL (ref 70–105)
HCT VFR BLD AUTO: 49 % (ref 42–54)
KETONES UR STRIP-MCNC: >=80 MG/DL
LYMPHOCYTES NFR SPEC AUTO: 26.1 % (ref 21–51)
MCH RBC QN AUTO: 27.5 PG (ref 27–33)
MCHC RBC AUTO-ENTMCNC: 32.9 G/DL (ref 32–36)
MCV RBC AUTO: 83.6 FL (ref 79–99)
MONOCYTES NFR BLD AUTO: 5.7 % (ref 3–13)
NEUTROPHILS NFR BLD AUTO: 67 % (ref 40–77)
NRBC BLD MANUAL-RTO: 0 % (ref 0–0.19)
OPIATES UR QL SCN: NEGATIVE
PCP UR QL SCN: NEGATIVE
PH UR STRIP: 5.5 [PH] (ref 5–8)
PLATELET # BLD AUTO: 292 K/UL (ref 130–400)
POTASSIUM SERPL-SCNC: 3.9 MMOL/L (ref 3.5–5.1)
PROT SERPL-MCNC: 8.2 G/DL (ref 6–8.3)
PROT UR QL STRIP: (no result) MG/DL
RBC # BLD AUTO: 5.86 MIL/UL (ref 4.5–6.2)
RBC MORPH BLD: (no result)
SALICYLATES SERPL-MCNC: 8.1 MG/DL (ref 2.8–20)
SODIUM SERPL-SCNC: 135 MMOL/L (ref 136–145)
SP GR UR STRIP: 1.03 (ref 1–1.03)
UROBILINOGEN UR STRIP-MCNC: 1 MG/DL (ref 0.2–1)
WBC # BLD AUTO: 10.1 K/UL (ref 4.8–10.8)
WBC #/AREA URNS HPF: (no result) /HPF (ref 0–1)

## 2022-02-11 PROCEDURE — 81001 URINALYSIS AUTO W/SCOPE: CPT

## 2022-02-11 PROCEDURE — 80053 COMPREHEN METABOLIC PANEL: CPT

## 2022-02-11 PROCEDURE — 96375 TX/PRO/DX INJ NEW DRUG ADDON: CPT

## 2022-02-11 PROCEDURE — 36415 COLL VENOUS BLD VENIPUNCTURE: CPT

## 2022-02-11 PROCEDURE — 80305 DRUG TEST PRSMV DIR OPT OBS: CPT

## 2022-02-11 PROCEDURE — 99284 EMERGENCY DEPT VISIT MOD MDM: CPT

## 2022-02-11 PROCEDURE — 87635 SARS-COV-2 COVID-19 AMP PRB: CPT

## 2022-02-11 PROCEDURE — 96365 THER/PROPH/DIAG IV INF INIT: CPT

## 2022-02-11 PROCEDURE — 85025 COMPLETE CBC W/AUTO DIFF WBC: CPT

## 2022-02-11 PROCEDURE — 82550 ASSAY OF CK (CPK): CPT

## 2022-02-11 PROCEDURE — 84484 ASSAY OF TROPONIN QUANT: CPT

## 2024-12-20 NOTE — ED TRIAGE NOTES
If worse go to the emergency department.    Patient presents to the ED with complaints of tachycardia and shortness of breath. Patient states this has been ongoing for 2 weeks. He was going to see a cardiologist soon but was unable to wait due to not feeling well. Patient denies any chest pain. States that he does not have a cardiac history.